# Patient Record
Sex: FEMALE | Race: WHITE | NOT HISPANIC OR LATINO | Employment: OTHER | URBAN - METROPOLITAN AREA
[De-identification: names, ages, dates, MRNs, and addresses within clinical notes are randomized per-mention and may not be internally consistent; named-entity substitution may affect disease eponyms.]

---

## 2017-03-16 ENCOUNTER — OFFICE VISIT - HEALTHEAST (OUTPATIENT)
Dept: INTERNAL MEDICINE | Facility: CLINIC | Age: 67
End: 2017-03-16

## 2017-03-16 DIAGNOSIS — Z00.00 PHYSICAL EXAM, ANNUAL: ICD-10-CM

## 2017-03-16 DIAGNOSIS — I10 ESSENTIAL HYPERTENSION: ICD-10-CM

## 2017-03-16 DIAGNOSIS — D12.6 ADENOMATOUS COLON POLYP: ICD-10-CM

## 2017-03-16 ASSESSMENT — MIFFLIN-ST. JEOR: SCORE: 1294.73

## 2017-03-27 ENCOUNTER — AMBULATORY - HEALTHEAST (OUTPATIENT)
Dept: INTERNAL MEDICINE | Facility: CLINIC | Age: 67
End: 2017-03-27

## 2017-05-17 ENCOUNTER — COMMUNICATION - HEALTHEAST (OUTPATIENT)
Dept: INTERNAL MEDICINE | Facility: CLINIC | Age: 67
End: 2017-05-17

## 2017-05-19 ENCOUNTER — COMMUNICATION - HEALTHEAST (OUTPATIENT)
Dept: INTERNAL MEDICINE | Facility: CLINIC | Age: 67
End: 2017-05-19

## 2017-07-06 ENCOUNTER — AMBULATORY - HEALTHEAST (OUTPATIENT)
Dept: INTERNAL MEDICINE | Facility: CLINIC | Age: 67
End: 2017-07-06

## 2017-07-06 DIAGNOSIS — G47.30 SLEEP APNEA: ICD-10-CM

## 2017-09-20 ENCOUNTER — OFFICE VISIT - HEALTHEAST (OUTPATIENT)
Dept: SLEEP MEDICINE | Facility: CLINIC | Age: 67
End: 2017-09-20

## 2017-09-20 DIAGNOSIS — G47.10 HYPERSOMNIA, UNSPECIFIED: ICD-10-CM

## 2017-09-20 DIAGNOSIS — E66.9 OBESITY: ICD-10-CM

## 2017-09-20 DIAGNOSIS — G47.8 SLEEP DYSFUNCTION WITH SLEEP STAGE DISTURBANCE: ICD-10-CM

## 2017-09-20 DIAGNOSIS — R06.83 SNORING: ICD-10-CM

## 2017-09-20 ASSESSMENT — MIFFLIN-ST. JEOR: SCORE: 1282.48

## 2017-09-27 ENCOUNTER — COMMUNICATION - HEALTHEAST (OUTPATIENT)
Dept: INTERNAL MEDICINE | Facility: CLINIC | Age: 67
End: 2017-09-27

## 2017-09-27 DIAGNOSIS — I10 ESSENTIAL HYPERTENSION: ICD-10-CM

## 2017-10-31 ENCOUNTER — OFFICE VISIT - HEALTHEAST (OUTPATIENT)
Dept: INTERNAL MEDICINE | Facility: CLINIC | Age: 67
End: 2017-10-31

## 2017-10-31 DIAGNOSIS — I10 ESSENTIAL HYPERTENSION: ICD-10-CM

## 2017-10-31 DIAGNOSIS — R00.2 PALPITATIONS: ICD-10-CM

## 2017-10-31 LAB
ATRIAL RATE - MUSE: 59 BPM
DIASTOLIC BLOOD PRESSURE - MUSE: NORMAL MMHG
INTERPRETATION ECG - MUSE: NORMAL
P AXIS - MUSE: 28 DEGREES
PR INTERVAL - MUSE: 176 MS
QRS DURATION - MUSE: 88 MS
QT - MUSE: 444 MS
QTC - MUSE: 439 MS
R AXIS - MUSE: -5 DEGREES
SYSTOLIC BLOOD PRESSURE - MUSE: NORMAL MMHG
T AXIS - MUSE: 11 DEGREES
VENTRICULAR RATE- MUSE: 59 BPM

## 2017-11-05 ENCOUNTER — RECORDS - HEALTHEAST (OUTPATIENT)
Dept: ADMINISTRATIVE | Facility: OTHER | Age: 67
End: 2017-11-05

## 2017-11-05 ENCOUNTER — RECORDS - HEALTHEAST (OUTPATIENT)
Dept: SLEEP MEDICINE | Facility: CLINIC | Age: 67
End: 2017-11-05

## 2017-11-05 DIAGNOSIS — G47.10 HYPERSOMNIA, UNSPECIFIED: ICD-10-CM

## 2017-11-05 DIAGNOSIS — R06.83 SNORING: ICD-10-CM

## 2017-11-05 DIAGNOSIS — G47.8 OTHER SLEEP DISORDERS: ICD-10-CM

## 2017-11-08 ENCOUNTER — COMMUNICATION - HEALTHEAST (OUTPATIENT)
Dept: SLEEP MEDICINE | Facility: CLINIC | Age: 67
End: 2017-11-08

## 2017-11-27 ENCOUNTER — OFFICE VISIT - HEALTHEAST (OUTPATIENT)
Dept: SLEEP MEDICINE | Facility: CLINIC | Age: 67
End: 2017-11-27

## 2017-11-27 ENCOUNTER — COMMUNICATION - HEALTHEAST (OUTPATIENT)
Dept: INTERNAL MEDICINE | Facility: CLINIC | Age: 67
End: 2017-11-27

## 2017-11-27 ENCOUNTER — OFFICE VISIT - HEALTHEAST (OUTPATIENT)
Dept: INTERNAL MEDICINE | Facility: CLINIC | Age: 67
End: 2017-11-27

## 2017-11-27 DIAGNOSIS — G47.10 HYPERSOMNIA: ICD-10-CM

## 2017-11-27 DIAGNOSIS — G47.8 UPPER AIRWAY RESISTANCE SYNDROME: ICD-10-CM

## 2017-11-27 DIAGNOSIS — G47.69 SLEEP-RELATED MOVEMENT DISORDER: ICD-10-CM

## 2017-11-27 DIAGNOSIS — I10 ESSENTIAL HYPERTENSION: ICD-10-CM

## 2017-11-27 DIAGNOSIS — I10 HTN (HYPERTENSION): ICD-10-CM

## 2017-11-27 ASSESSMENT — MIFFLIN-ST. JEOR: SCORE: 1291.09

## 2018-01-02 ENCOUNTER — COMMUNICATION - HEALTHEAST (OUTPATIENT)
Dept: INTERNAL MEDICINE | Facility: CLINIC | Age: 68
End: 2018-01-02

## 2018-01-05 ENCOUNTER — OFFICE VISIT - HEALTHEAST (OUTPATIENT)
Dept: INTERNAL MEDICINE | Facility: CLINIC | Age: 68
End: 2018-01-05

## 2018-01-05 DIAGNOSIS — R73.09 ELEVATED GLUCOSE: ICD-10-CM

## 2018-01-05 DIAGNOSIS — I10 HYPERTENSION: ICD-10-CM

## 2018-01-05 LAB
ANION GAP SERPL CALCULATED.3IONS-SCNC: 11 MMOL/L (ref 5–18)
BUN SERPL-MCNC: 24 MG/DL (ref 8–22)
CALCIUM SERPL-MCNC: 9.5 MG/DL (ref 8.5–10.5)
CHLORIDE BLD-SCNC: 103 MMOL/L (ref 98–107)
CO2 SERPL-SCNC: 27 MMOL/L (ref 22–31)
CREAT SERPL-MCNC: 0.95 MG/DL (ref 0.6–1.1)
GFR SERPL CREATININE-BSD FRML MDRD: 59 ML/MIN/1.73M2
GLUCOSE BLD-MCNC: 90 MG/DL (ref 70–125)
HBA1C MFR BLD: 5.7 % (ref 3.5–6)
POTASSIUM BLD-SCNC: 3.9 MMOL/L (ref 3.5–5)
SODIUM SERPL-SCNC: 141 MMOL/L (ref 136–145)

## 2018-01-25 ENCOUNTER — COMMUNICATION - HEALTHEAST (OUTPATIENT)
Dept: INTERNAL MEDICINE | Facility: CLINIC | Age: 68
End: 2018-01-25

## 2018-01-25 ENCOUNTER — OFFICE VISIT - HEALTHEAST (OUTPATIENT)
Dept: INTERNAL MEDICINE | Facility: CLINIC | Age: 68
End: 2018-01-25

## 2018-01-25 DIAGNOSIS — R05.9 COUGH: ICD-10-CM

## 2018-01-25 DIAGNOSIS — I10 ESSENTIAL HYPERTENSION: ICD-10-CM

## 2018-01-25 DIAGNOSIS — J10.1 INFLUENZA A: ICD-10-CM

## 2018-01-25 LAB
FLUAV AG SPEC QL IA: ABNORMAL
FLUBV AG SPEC QL IA: ABNORMAL

## 2018-03-09 ENCOUNTER — HOSPITAL ENCOUNTER (OUTPATIENT)
Dept: MAMMOGRAPHY | Facility: CLINIC | Age: 68
Discharge: HOME | End: 2018-03-09
Attending: INTERNAL MEDICINE

## 2018-03-09 DIAGNOSIS — Z12.31 VISIT FOR SCREENING MAMMOGRAM: ICD-10-CM

## 2018-12-17 ENCOUNTER — OFFICE VISIT - HEALTHEAST (OUTPATIENT)
Dept: FAMILY MEDICINE | Facility: CLINIC | Age: 68
End: 2018-12-17

## 2018-12-17 DIAGNOSIS — I10 ESSENTIAL HYPERTENSION: ICD-10-CM

## 2018-12-17 DIAGNOSIS — N39.46 MIXED INCONTINENCE URGE AND STRESS (MALE)(FEMALE): ICD-10-CM

## 2018-12-17 DIAGNOSIS — Z76.89 ESTABLISHING CARE WITH NEW DOCTOR, ENCOUNTER FOR: ICD-10-CM

## 2018-12-26 ENCOUNTER — COMMUNICATION - HEALTHEAST (OUTPATIENT)
Dept: INTERNAL MEDICINE | Facility: CLINIC | Age: 68
End: 2018-12-26

## 2019-02-06 ENCOUNTER — COMMUNICATION - HEALTHEAST (OUTPATIENT)
Dept: FAMILY MEDICINE | Facility: CLINIC | Age: 69
End: 2019-02-06

## 2019-02-06 DIAGNOSIS — Z00.00 ROUTINE GENERAL MEDICAL EXAMINATION AT A HEALTH CARE FACILITY: ICD-10-CM

## 2019-03-25 ENCOUNTER — HOSPITAL ENCOUNTER (OUTPATIENT)
Dept: MAMMOGRAPHY | Facility: CLINIC | Age: 69
Discharge: HOME | End: 2019-03-25

## 2019-03-25 DIAGNOSIS — Z12.31 ENCOUNTER FOR SCREENING MAMMOGRAM FOR BREAST CANCER: ICD-10-CM

## 2019-04-02 ENCOUNTER — OFFICE VISIT - HEALTHEAST (OUTPATIENT)
Dept: FAMILY MEDICINE | Facility: CLINIC | Age: 69
End: 2019-04-02

## 2019-04-02 DIAGNOSIS — M79.661 PAIN OF RIGHT LOWER LEG: ICD-10-CM

## 2019-04-03 ENCOUNTER — HOSPITAL ENCOUNTER (OUTPATIENT)
Dept: ULTRASOUND IMAGING | Facility: CLINIC | Age: 69
Discharge: HOME | End: 2019-04-03

## 2019-04-03 DIAGNOSIS — M79.661 PAIN OF RIGHT LOWER LEG: ICD-10-CM

## 2019-04-04 ENCOUNTER — COMMUNICATION - HEALTHEAST (OUTPATIENT)
Dept: FAMILY MEDICINE | Facility: CLINIC | Age: 69
End: 2019-04-04

## 2019-04-09 ENCOUNTER — COMMUNICATION - HEALTHEAST (OUTPATIENT)
Dept: FAMILY MEDICINE | Facility: CLINIC | Age: 69
End: 2019-04-09

## 2019-04-09 DIAGNOSIS — M79.661 PAIN OF RIGHT LOWER LEG: ICD-10-CM

## 2019-04-09 DIAGNOSIS — I10 ESSENTIAL HYPERTENSION: ICD-10-CM

## 2019-04-09 DIAGNOSIS — N32.81 OVERACTIVE BLADDER: ICD-10-CM

## 2019-04-12 ENCOUNTER — RECORDS - HEALTHEAST (OUTPATIENT)
Dept: ADMINISTRATIVE | Facility: OTHER | Age: 69
End: 2019-04-12

## 2019-05-03 ENCOUNTER — RECORDS - HEALTHEAST (OUTPATIENT)
Dept: ADMINISTRATIVE | Facility: OTHER | Age: 69
End: 2019-05-03

## 2019-05-10 ENCOUNTER — RECORDS - HEALTHEAST (OUTPATIENT)
Dept: ADMINISTRATIVE | Facility: OTHER | Age: 69
End: 2019-05-10

## 2019-05-15 ENCOUNTER — RECORDS - HEALTHEAST (OUTPATIENT)
Dept: ADMINISTRATIVE | Facility: OTHER | Age: 69
End: 2019-05-15

## 2019-06-07 ENCOUNTER — RECORDS - HEALTHEAST (OUTPATIENT)
Dept: ADMINISTRATIVE | Facility: OTHER | Age: 69
End: 2019-06-07

## 2019-07-07 ENCOUNTER — COMMUNICATION - HEALTHEAST (OUTPATIENT)
Dept: FAMILY MEDICINE | Facility: CLINIC | Age: 69
End: 2019-07-07

## 2019-07-07 DIAGNOSIS — I10 ESSENTIAL HYPERTENSION: ICD-10-CM

## 2019-07-12 ENCOUNTER — RECORDS - HEALTHEAST (OUTPATIENT)
Dept: ADMINISTRATIVE | Facility: OTHER | Age: 69
End: 2019-07-12

## 2019-07-29 ENCOUNTER — RECORDS - HEALTHEAST (OUTPATIENT)
Dept: ADMINISTRATIVE | Facility: OTHER | Age: 69
End: 2019-07-29

## 2019-09-06 ENCOUNTER — RECORDS - HEALTHEAST (OUTPATIENT)
Dept: ADMINISTRATIVE | Facility: OTHER | Age: 69
End: 2019-09-06

## 2019-10-22 ENCOUNTER — OFFICE VISIT - HEALTHEAST (OUTPATIENT)
Dept: FAMILY MEDICINE | Facility: CLINIC | Age: 69
End: 2019-10-22

## 2019-10-22 DIAGNOSIS — B07.8 COMMON WART: ICD-10-CM

## 2019-10-22 DIAGNOSIS — R00.2 HEART PALPITATIONS: ICD-10-CM

## 2019-10-22 DIAGNOSIS — I10 ESSENTIAL HYPERTENSION: ICD-10-CM

## 2019-10-22 LAB
ALBUMIN SERPL-MCNC: 4.2 G/DL (ref 3.5–5)
ALP SERPL-CCNC: 86 U/L (ref 45–120)
ALT SERPL W P-5'-P-CCNC: 34 U/L (ref 0–45)
ANION GAP SERPL CALCULATED.3IONS-SCNC: 9 MMOL/L (ref 5–18)
AST SERPL W P-5'-P-CCNC: 24 U/L (ref 0–40)
BILIRUB SERPL-MCNC: 0.7 MG/DL (ref 0–1)
BUN SERPL-MCNC: 14 MG/DL (ref 8–22)
CALCIUM SERPL-MCNC: 9.7 MG/DL (ref 8.5–10.5)
CHLORIDE BLD-SCNC: 104 MMOL/L (ref 98–107)
CHOLEST SERPL-MCNC: 216 MG/DL
CO2 SERPL-SCNC: 28 MMOL/L (ref 22–31)
CREAT SERPL-MCNC: 0.97 MG/DL (ref 0.6–1.1)
FASTING STATUS PATIENT QL REPORTED: YES
GFR SERPL CREATININE-BSD FRML MDRD: 57 ML/MIN/1.73M2
GLUCOSE BLD-MCNC: 89 MG/DL (ref 70–125)
HDLC SERPL-MCNC: 65 MG/DL
LDLC SERPL CALC-MCNC: 125 MG/DL
POTASSIUM BLD-SCNC: 4.6 MMOL/L (ref 3.5–5)
PROT SERPL-MCNC: 6.6 G/DL (ref 6–8)
SODIUM SERPL-SCNC: 141 MMOL/L (ref 136–145)
TRIGL SERPL-MCNC: 129 MG/DL
TSH SERPL DL<=0.005 MIU/L-ACNC: 1.77 UIU/ML (ref 0.3–5)

## 2019-10-22 RX ORDER — HYDROCHLOROTHIAZIDE 25 MG/1
25 TABLET ORAL DAILY
Qty: 90 TABLET | Refills: 3 | Status: SHIPPED | OUTPATIENT
Start: 2019-10-22 | End: 2022-01-15

## 2019-10-30 ENCOUNTER — RECORDS - HEALTHEAST (OUTPATIENT)
Dept: ADMINISTRATIVE | Facility: OTHER | Age: 69
End: 2019-10-30

## 2019-11-05 ENCOUNTER — HOSPITAL ENCOUNTER (OUTPATIENT)
Dept: CARDIOLOGY | Facility: CLINIC | Age: 69
Discharge: HOME | End: 2019-11-05

## 2019-11-05 ENCOUNTER — OFFICE VISIT - HEALTHEAST (OUTPATIENT)
Dept: FAMILY MEDICINE | Facility: CLINIC | Age: 69
End: 2019-11-05

## 2019-11-05 DIAGNOSIS — R00.2 HEART PALPITATIONS: ICD-10-CM

## 2019-11-05 DIAGNOSIS — I10 ESSENTIAL HYPERTENSION: ICD-10-CM

## 2019-11-05 DIAGNOSIS — B07.8 COMMON WART: ICD-10-CM

## 2019-11-05 LAB
AORTIC ROOT: 3.1 CM
BSA FOR ECHO PROCEDURE: 1.89 M2
CV BLOOD PRESSURE: ABNORMAL MMHG
CV ECHO HEIGHT: 64 IN
CV ECHO WEIGHT: 174 LBS
DOP CALC LVOT AREA: 2.54 CM2
DOP CALC LVOT DIAMETER: 1.8 CM
DOP CALC LVOT PEAK VEL: 82.7 CM/S
DOP CALC LVOT STROKE VOLUME: 45.5 CM3
DOP CALC MV VTI: 30 CM
DOP CALCLVOT PEAK VEL VTI: 17.9 CM
EJECTION FRACTION: 73 % (ref 55–75)
FRACTIONAL SHORTENING: 44.3 % (ref 28–44)
INTERVENTRICULAR SEPTUM IN END DIASTOLE: 0.92 CM (ref 0.6–0.9)
IVS/PW RATIO: 1.1
LA AREA 1: 17.2 CM2
LA AREA 2: 13.3 CM2
LEFT ATRIUM LENGTH: 4.82 CM
LEFT ATRIUM SIZE: 4.2 CM
LEFT ATRIUM TO AORTIC ROOT RATIO: 1.35 NO UNITS
LEFT ATRIUM VOLUME INDEX: 21.3 ML/M2
LEFT ATRIUM VOLUME: 40.3 ML
LEFT VENTRICLE CARDIAC INDEX: 1.6 L/MIN/M2
LEFT VENTRICLE CARDIAC OUTPUT: 3 L/MIN
LEFT VENTRICLE DIASTOLIC VOLUME INDEX: 23.3 CM3/M2 (ref 29–61)
LEFT VENTRICLE DIASTOLIC VOLUME: 44 CM3 (ref 46–106)
LEFT VENTRICLE HEART RATE: 65 BPM
LEFT VENTRICLE MASS INDEX: 74.8 G/M2
LEFT VENTRICLE SYSTOLIC VOLUME INDEX: 6.3 CM3/M2 (ref 8–24)
LEFT VENTRICLE SYSTOLIC VOLUME: 12 CM3 (ref 14–42)
LEFT VENTRICULAR INTERNAL DIMENSION IN DIASTOLE: 4.83 CM (ref 3.8–5.2)
LEFT VENTRICULAR INTERNAL DIMENSION IN SYSTOLE: 2.69 CM (ref 2.2–3.5)
LEFT VENTRICULAR MASS: 141.3 G
LEFT VENTRICULAR OUTFLOW TRACT MEAN GRADIENT: 2 MMHG
LEFT VENTRICULAR OUTFLOW TRACT MEAN VELOCITY: 59.1 CM/S
LEFT VENTRICULAR OUTFLOW TRACT PEAK GRADIENT: 3 MMHG
LEFT VENTRICULAR POSTERIOR WALL IN END DIASTOLE: 0.81 CM (ref 0.6–0.9)
LV STROKE VOLUME INDEX: 24.1 ML/M2
MITRAL VALVE E/A RATIO: 1
MITRAL VALVE MEAN INFLOW VELOCITY: 66.3 CM/S
MITRAL VALVE PEAK VELOCITY: 102 CM/S
MV AREA VTI: 1.52 CM2
MV AVERAGE E/E' RATIO: 9.6 CM/S
MV DECELERATION TIME: 261 MS
MV E'TISSUE VEL-LAT: 10.8 CM/S
MV E'TISSUE VEL-MED: 6.43 CM/S
MV LATERAL E/E' RATIO: 7.7
MV MEAN GRADIENT: 2 MMHG
MV MEDIAL E/E' RATIO: 12.9
MV PEAK A VELOCITY: 83.9 CM/S
MV PEAK E VELOCITY: 82.9 CM/S
MV PEAK GRADIENT: 4.2 MMHG
MV VALVE AREA BY CONTINUITY EQUATION: 1.5 CM2
NUC REST DIASTOLIC VOLUME INDEX: 2784 LBS
NUC REST SYSTOLIC VOLUME INDEX: 64 IN
TRICUSPID REGURGITATION PEAK PRESSURE GRADIENT: 22.1 MMHG
TRICUSPID VALVE ANULAR PLANE SYSTOLIC EXCURSION: 2.1 CM
TRICUSPID VALVE PEAK REGURGITANT VELOCITY: 235 CM/S

## 2019-11-05 ASSESSMENT — MIFFLIN-ST. JEOR: SCORE: 1299.26

## 2019-11-11 ENCOUNTER — COMMUNICATION - HEALTHEAST (OUTPATIENT)
Dept: FAMILY MEDICINE | Facility: CLINIC | Age: 69
End: 2019-11-11

## 2019-11-12 ENCOUNTER — RECORDS - HEALTHEAST (OUTPATIENT)
Dept: ADMINISTRATIVE | Facility: OTHER | Age: 69
End: 2019-11-12

## 2019-11-25 ENCOUNTER — RECORDS - HEALTHEAST (OUTPATIENT)
Dept: ADMINISTRATIVE | Facility: OTHER | Age: 69
End: 2019-11-25

## 2019-12-20 ENCOUNTER — OFFICE VISIT - HEALTHEAST (OUTPATIENT)
Dept: FAMILY MEDICINE | Facility: CLINIC | Age: 69
End: 2019-12-20

## 2019-12-20 DIAGNOSIS — J02.9 VIRAL PHARYNGITIS: ICD-10-CM

## 2019-12-20 LAB — DEPRECATED S PYO AG THROAT QL EIA: NORMAL

## 2019-12-21 LAB — GROUP A STREP BY PCR: NORMAL

## 2020-01-02 ENCOUNTER — OFFICE VISIT - HEALTHEAST (OUTPATIENT)
Dept: FAMILY MEDICINE | Facility: CLINIC | Age: 70
End: 2020-01-02

## 2020-01-02 DIAGNOSIS — Z01.818 ENCOUNTER FOR PREOPERATIVE EXAMINATION FOR GENERAL SURGICAL PROCEDURE: ICD-10-CM

## 2020-01-02 DIAGNOSIS — M17.11 PRIMARY OSTEOARTHRITIS OF RIGHT KNEE: ICD-10-CM

## 2020-01-02 LAB
ALBUMIN UR-MCNC: NEGATIVE MG/DL
ANION GAP SERPL CALCULATED.3IONS-SCNC: 10 MMOL/L (ref 5–18)
APPEARANCE UR: CLEAR
ATRIAL RATE - MUSE: 73 BPM
BACTERIA #/AREA URNS HPF: ABNORMAL HPF
BILIRUB UR QL STRIP: NEGATIVE
BUN SERPL-MCNC: 20 MG/DL (ref 8–22)
CALCIUM SERPL-MCNC: 9.5 MG/DL (ref 8.5–10.5)
CHLORIDE BLD-SCNC: 102 MMOL/L (ref 98–107)
CO2 SERPL-SCNC: 27 MMOL/L (ref 22–31)
COLOR UR AUTO: YELLOW
CREAT SERPL-MCNC: 1.13 MG/DL (ref 0.6–1.1)
DIASTOLIC BLOOD PRESSURE - MUSE: NORMAL
GFR SERPL CREATININE-BSD FRML MDRD: 48 ML/MIN/1.73M2
GLUCOSE BLD-MCNC: 91 MG/DL (ref 70–125)
GLUCOSE UR STRIP-MCNC: NEGATIVE MG/DL
HGB BLD-MCNC: 12.5 G/DL (ref 12–16)
HGB UR QL STRIP: ABNORMAL
INR PPP: 0.95 (ref 0.9–1.1)
INTERPRETATION ECG - MUSE: NORMAL
KETONES UR STRIP-MCNC: NEGATIVE MG/DL
LEUKOCYTE ESTERASE UR QL STRIP: NEGATIVE
NITRATE UR QL: NEGATIVE
P AXIS - MUSE: 46 DEGREES
PH UR STRIP: 5.5 [PH] (ref 5–8)
POTASSIUM BLD-SCNC: 3.4 MMOL/L (ref 3.5–5)
PR INTERVAL - MUSE: 184 MS
QRS DURATION - MUSE: 86 MS
QT - MUSE: 402 MS
QTC - MUSE: 442 MS
R AXIS - MUSE: 2 DEGREES
RBC #/AREA URNS AUTO: ABNORMAL HPF
SODIUM SERPL-SCNC: 139 MMOL/L (ref 136–145)
SP GR UR STRIP: 1.02 (ref 1–1.03)
SQUAMOUS #/AREA URNS AUTO: ABNORMAL LPF
SYSTOLIC BLOOD PRESSURE - MUSE: NORMAL
T AXIS - MUSE: 18 DEGREES
UROBILINOGEN UR STRIP-ACNC: ABNORMAL
VENTRICULAR RATE- MUSE: 73 BPM
WBC #/AREA URNS AUTO: ABNORMAL HPF

## 2020-01-03 ENCOUNTER — RECORDS - HEALTHEAST (OUTPATIENT)
Dept: LAB | Facility: CLINIC | Age: 70
End: 2020-01-03

## 2020-01-03 LAB
ERYTHROCYTE [DISTWIDTH] IN BLOOD BY AUTOMATED COUNT: 13.2 % (ref 11–14.5)
HCT VFR BLD AUTO: 39.4 % (ref 35–47)
HGB BLD-MCNC: 12.8 G/DL (ref 12–16)
MCH RBC QN AUTO: 30.9 PG (ref 27–34)
MCHC RBC AUTO-ENTMCNC: 32.5 G/DL (ref 32–36)
MCV RBC AUTO: 95 FL (ref 80–100)
PLATELET # BLD AUTO: 287 THOU/UL (ref 140–440)
PMV BLD AUTO: 9.3 FL (ref 8.5–12.5)
RBC # BLD AUTO: 4.14 MILL/UL (ref 3.8–5.4)
WBC: 7.7 THOU/UL (ref 4–11)

## 2020-01-06 ENCOUNTER — COMMUNICATION - HEALTHEAST (OUTPATIENT)
Dept: FAMILY MEDICINE | Facility: CLINIC | Age: 70
End: 2020-01-06

## 2020-01-09 ENCOUNTER — RECORDS - HEALTHEAST (OUTPATIENT)
Dept: ADMINISTRATIVE | Facility: OTHER | Age: 70
End: 2020-01-09

## 2020-01-14 ENCOUNTER — OFFICE VISIT - HEALTHEAST (OUTPATIENT)
Dept: FAMILY MEDICINE | Facility: CLINIC | Age: 70
End: 2020-01-14

## 2020-01-14 DIAGNOSIS — R30.9 URINARY PAIN: ICD-10-CM

## 2020-01-14 DIAGNOSIS — R94.4 DECREASED GFR: ICD-10-CM

## 2020-01-14 LAB
ALBUMIN UR-MCNC: NEGATIVE MG/DL
ANION GAP SERPL CALCULATED.3IONS-SCNC: 8 MMOL/L (ref 5–18)
APPEARANCE UR: CLEAR
BACTERIA #/AREA URNS HPF: ABNORMAL HPF
BILIRUB UR QL STRIP: NEGATIVE
BUN SERPL-MCNC: 23 MG/DL (ref 8–22)
CALCIUM SERPL-MCNC: 9.4 MG/DL (ref 8.5–10.5)
CHLORIDE BLD-SCNC: 102 MMOL/L (ref 98–107)
CO2 SERPL-SCNC: 27 MMOL/L (ref 22–31)
COLOR UR AUTO: YELLOW
CREAT SERPL-MCNC: 1 MG/DL (ref 0.6–1.1)
GFR SERPL CREATININE-BSD FRML MDRD: 55 ML/MIN/1.73M2
GLUCOSE BLD-MCNC: 117 MG/DL (ref 70–125)
GLUCOSE UR STRIP-MCNC: NEGATIVE MG/DL
HGB UR QL STRIP: ABNORMAL
KETONES UR STRIP-MCNC: NEGATIVE MG/DL
LEUKOCYTE ESTERASE UR QL STRIP: NEGATIVE
NITRATE UR QL: NEGATIVE
PH UR STRIP: 7 [PH] (ref 5–8)
POTASSIUM BLD-SCNC: 4.2 MMOL/L (ref 3.5–5)
RBC #/AREA URNS AUTO: ABNORMAL HPF
SODIUM SERPL-SCNC: 137 MMOL/L (ref 136–145)
SP GR UR STRIP: 1.02 (ref 1–1.03)
SQUAMOUS #/AREA URNS AUTO: ABNORMAL LPF
UROBILINOGEN UR STRIP-ACNC: ABNORMAL
WBC #/AREA URNS AUTO: ABNORMAL HPF

## 2020-01-14 RX ORDER — HYDROXYZINE HYDROCHLORIDE 25 MG/1
25 TABLET, FILM COATED ORAL EVERY 6 HOURS PRN
Status: SHIPPED | COMMUNITY
Start: 2020-01-03 | End: 2021-12-15

## 2020-01-14 RX ORDER — KETOROLAC TROMETHAMINE 10 MG/1
10 TABLET, FILM COATED ORAL 4 TIMES DAILY PRN
Status: SHIPPED | COMMUNITY
Start: 2020-01-03 | End: 2021-08-04

## 2020-01-30 ENCOUNTER — RECORDS - HEALTHEAST (OUTPATIENT)
Dept: ADMINISTRATIVE | Facility: OTHER | Age: 70
End: 2020-01-30

## 2020-02-28 ENCOUNTER — RECORDS - HEALTHEAST (OUTPATIENT)
Dept: ADMINISTRATIVE | Facility: OTHER | Age: 70
End: 2020-02-28

## 2020-08-13 ENCOUNTER — OFFICE VISIT - HEALTHEAST (OUTPATIENT)
Dept: FAMILY MEDICINE | Facility: CLINIC | Age: 70
End: 2020-08-13

## 2020-08-13 DIAGNOSIS — I10 ESSENTIAL HYPERTENSION: ICD-10-CM

## 2020-11-19 ENCOUNTER — OFFICE VISIT - HEALTHEAST (OUTPATIENT)
Dept: FAMILY MEDICINE | Facility: CLINIC | Age: 70
End: 2020-11-19

## 2020-11-19 DIAGNOSIS — Z00.00 ROUTINE GENERAL MEDICAL EXAMINATION AT A HEALTH CARE FACILITY: ICD-10-CM

## 2020-11-19 DIAGNOSIS — Z23 NEED FOR VACCINATION: ICD-10-CM

## 2020-11-19 DIAGNOSIS — Z13.220 ENCOUNTER FOR SCREENING FOR LIPOID DISORDERS: ICD-10-CM

## 2020-11-19 DIAGNOSIS — B35.1 NAIL FUNGAL INFECTION: ICD-10-CM

## 2020-11-19 DIAGNOSIS — R30.0 DYSURIA: ICD-10-CM

## 2020-11-19 LAB
ALBUMIN UR-MCNC: NEGATIVE MG/DL
APPEARANCE UR: CLEAR
BACTERIA #/AREA URNS HPF: ABNORMAL HPF
BILIRUB UR QL STRIP: NEGATIVE
COLOR UR AUTO: YELLOW
GLUCOSE UR STRIP-MCNC: NEGATIVE MG/DL
HGB UR QL STRIP: ABNORMAL
KETONES UR STRIP-MCNC: NEGATIVE MG/DL
LEUKOCYTE ESTERASE UR QL STRIP: NEGATIVE
NITRATE UR QL: NEGATIVE
PH UR STRIP: 5.5 [PH] (ref 5–8)
RBC #/AREA URNS AUTO: ABNORMAL HPF
SP GR UR STRIP: >=1.03 (ref 1–1.03)
SQUAMOUS #/AREA URNS AUTO: ABNORMAL LPF
UROBILINOGEN UR STRIP-ACNC: ABNORMAL
WBC #/AREA URNS AUTO: ABNORMAL HPF

## 2020-11-19 ASSESSMENT — MIFFLIN-ST. JEOR: SCORE: 1287.92

## 2020-11-23 ENCOUNTER — COMMUNICATION - HEALTHEAST (OUTPATIENT)
Dept: FAMILY MEDICINE | Facility: CLINIC | Age: 70
End: 2020-11-23

## 2020-11-27 ENCOUNTER — COMMUNICATION - HEALTHEAST (OUTPATIENT)
Dept: FAMILY MEDICINE | Facility: CLINIC | Age: 70
End: 2020-11-27

## 2020-12-02 ENCOUNTER — COMMUNICATION - HEALTHEAST (OUTPATIENT)
Dept: FAMILY MEDICINE | Facility: CLINIC | Age: 70
End: 2020-12-02

## 2020-12-02 DIAGNOSIS — I10 ESSENTIAL HYPERTENSION: ICD-10-CM

## 2020-12-02 RX ORDER — LOSARTAN POTASSIUM 100 MG/1
100 TABLET ORAL DAILY
Qty: 90 TABLET | Refills: 3 | Status: SHIPPED | OUTPATIENT
Start: 2020-12-02 | End: 2021-12-15

## 2020-12-02 RX ORDER — AMLODIPINE BESYLATE 5 MG/1
5 TABLET ORAL DAILY
Qty: 90 TABLET | Refills: 3 | Status: SHIPPED | OUTPATIENT
Start: 2020-12-02 | End: 2021-12-15

## 2020-12-03 ENCOUNTER — AMBULATORY - HEALTHEAST (OUTPATIENT)
Dept: LAB | Facility: CLINIC | Age: 70
End: 2020-12-03

## 2020-12-03 DIAGNOSIS — Z00.00 ROUTINE GENERAL MEDICAL EXAMINATION AT A HEALTH CARE FACILITY: ICD-10-CM

## 2020-12-03 DIAGNOSIS — Z13.220 ENCOUNTER FOR SCREENING FOR LIPOID DISORDERS: ICD-10-CM

## 2020-12-03 LAB
ANION GAP SERPL CALCULATED.3IONS-SCNC: 11 MMOL/L (ref 5–18)
BUN SERPL-MCNC: 21 MG/DL (ref 8–22)
CALCIUM SERPL-MCNC: 9.1 MG/DL (ref 8.5–10.5)
CHLORIDE BLD-SCNC: 105 MMOL/L (ref 98–107)
CHOLEST SERPL-MCNC: 168 MG/DL
CO2 SERPL-SCNC: 25 MMOL/L (ref 22–31)
CREAT SERPL-MCNC: 0.96 MG/DL (ref 0.6–1.1)
FASTING STATUS PATIENT QL REPORTED: YES
GFR SERPL CREATININE-BSD FRML MDRD: 58 ML/MIN/1.73M2
GLUCOSE BLD-MCNC: 92 MG/DL (ref 70–125)
HDLC SERPL-MCNC: 57 MG/DL
HGB BLD-MCNC: 12.2 G/DL (ref 12–16)
LDLC SERPL CALC-MCNC: 95 MG/DL
POTASSIUM BLD-SCNC: 4.3 MMOL/L (ref 3.5–5)
SODIUM SERPL-SCNC: 141 MMOL/L (ref 136–145)
TRIGL SERPL-MCNC: 79 MG/DL

## 2021-01-24 ENCOUNTER — COMMUNICATION - HEALTHEAST (OUTPATIENT)
Dept: FAMILY MEDICINE | Facility: CLINIC | Age: 71
End: 2021-01-24

## 2021-02-22 ENCOUNTER — RECORDS - HEALTHEAST (OUTPATIENT)
Dept: ADMINISTRATIVE | Facility: OTHER | Age: 71
End: 2021-02-22

## 2021-05-26 ENCOUNTER — RECORDS - HEALTHEAST (OUTPATIENT)
Dept: ADMINISTRATIVE | Facility: CLINIC | Age: 71
End: 2021-05-26

## 2021-05-27 NOTE — PROGRESS NOTES
"Attestation:  I Sally Garrett DNP, performed a history and physical examination of the patient and discussed management with the NP student. I reviewed the NP student s note and agree with the documented findings and plan of care.       Assessment/Plan:  1. Right popliteal pain with unknown etiology  -Elevate leg when resting  - Heat to affected area 1-2 times a day  -Continue with ADLs and exercise as tolerated  -Take OTC ibuprofen 400mg-600mg [id number] PRN  -Lower extremity ultra sound to rule out blood clot  -RTC if pain worsen (e.g., interfere with ADLs, difficulty walking, LEs swelling)       Subjective:   Patient is a 68 y.o female presents in clinic today with complain of right popliteal pain that she describes as \"constant aches that at times will wake her up in the middle of the night\". Patient reports that her symptoms has been present for the last 6 weeks. She tried OTC ibuprofen and naproxen 1x every 2 weeks with good effect. Patient indicated that she tries to do \"stick it out and not take medications\" for her pain.    Review of System:  Constitutional: alert, calm without any overt signs of distress.  Skin: denies any rashes  ENT: Denies nasal congestion and post-nasal drip. Denies dizziness or loss of balance. Denies dysphagia and GERD/heartburn.   Cardiovascular: Denies heart palpitation, dizziness, chest pain, orthopnea, or edema  Pulmonary: Denies any shortness of breath, wheezes, or rhonchi.   GI: Denies n/v, diarrhea, or abdominal pain.  : Denies dysuria.  Abdominal: Denies gallbladder pain, liver, spleen problem. Denies any hernia.  Musculoskeletal: C/O of R popliteal pain that patient reports as constant and achy. She uses OTC ibuprofen and naproxen PRN for her pain and reports wit good effects for her pain. Patient denies any injury.  Neurological: Denies weakness, numbness, paraesthesia, of LOC, or coordination changes.     Objective:  /85   Pulse 83   Wt 174 lb 5 oz (79.1 kg)  "  LMP 03/09/2007   SpO2 94%   BMI 29.92 kg/m       General appearance: alert without any sign of distress  Head: Normocephalic, without obvious abnormality, atraumatic  Eyes: conjunctivae and corneas clear. PERRL, EOM's intact. Fundi benign.  Ears: normal tympanic membrane and external ear canals both ears  Throat: lips, mucosa, and tongue normal; teeth and gums normal  Neck:  No carotid bruit, no JVD, supple, symmetrical, trachea midline and thyroid not enlarged, symmetric, no tenderness/mass/nodules  Back: symmetric, no curvature. ROM normal. No CVA tenderness.  Lungs: Clear.  No adventitious lung sounds noted.   Chest wall: no tenderness  Heart: regular rate and rhythm, S1, S2 normal, no murmur, click, rub or gallop  Abdomen: soft, non-tender; bowel sounds normal; no masses,  no organomegaly  Extremities: extremities normal, atraumatic, no cyanosis or edema  Pulses: 2+ and symmetric  Skin: Skin color, texture, turgor normal. No rashes or lesions  Lymph nodes: No adenopathy. Cervical, submandibular, supraclavicular, and axillary nodes normal.  Musculoskeletal: No erythema or edema noted. Slight pain on the R popliteal that can be intermittently reproduce. No signs of LEs structure abnormality. No crepitus noted on palpation. Normal gait pattern when patient ambulates.    Neurologic: Grossly normal.

## 2021-05-30 VITALS — HEIGHT: 65 IN | WEIGHT: 170.9 LBS | BODY MASS INDEX: 28.47 KG/M2

## 2021-05-30 NOTE — TELEPHONE ENCOUNTER
RN cannot approve Refill Request    RN can NOT refill this medication PCP messaged that patient is overdue for Labs. Last office visit: 4/2/2019 Sally Garrett FNP Last Physical: Visit date not found Last MTM visit: Visit date not found Last visit same specialty: 4/2/2019 Sally Garrett FNP.  Next visit within 3 mo: Visit date not found  Next physical within 3 mo: Visit date not found      Katy Kinsey, [facility] Triage/Med Refill 7/7/2019    Requested Prescriptions   Pending Prescriptions Disp Refills     hydroCHLOROthiazide (HYDRODIURIL) 25 MG tablet [Pharmacy Med Name: HYDROCHLOROTHIAZIDE 25 MG TAB] 90 tablet 0     Sig: TAKE 1 TABLET BY MOUTH EVERY DAY       Diuretics/Combination Diuretics Refill Protocol  Failed - 7/7/2019 11:00 AM        Failed - Serum Potassium in past 12 months      No results found for: LN-POTASSIUM          Failed - Serum Sodium in past 12 months      No results found for: LN-SODIUM          Failed - Serum Creatinine in past 12 months      Creatinine   Date Value Ref Range Status   01/05/2018 0.95 0.60 - 1.10 mg/dL Final             Passed - Visit with PCP or prescribing provider visit in past 12 months     Last office visit with prescriber/PCP: 4/2/2019 Sally Garrett FNP OR same dept: 4/2/2019 Sally Garrett FNP OR same specialty: 4/2/2019 Sally Garrett FNP  Last physical: Visit date not found Last MTM visit: Visit date not found   Next visit within 3 mo: Visit date not found  Next physical within 3 mo: Visit date not found  Prescriber OR PCP: NAV Spivey  Last diagnosis associated with med order: 1. Hypertension  - hydroCHLOROthiazide (HYDRODIURIL) 25 MG tablet [Pharmacy Med Name: HYDROCHLOROTHIAZIDE 25 MG TAB]; TAKE 1 TABLET BY MOUTH EVERY DAY  Dispense: 90 tablet; Refill: 0    If protocol passes may refill for 12 months if within 3 months of last provider visit (or a total of 15 months).             Passed - Blood pressure on file in past  12 months     BP Readings from Last 1 Encounters:   04/02/19 118/85

## 2021-05-31 VITALS — BODY MASS INDEX: 29.78 KG/M2 | WEIGHT: 173.5 LBS

## 2021-05-31 VITALS — BODY MASS INDEX: 29.4 KG/M2 | WEIGHT: 172.2 LBS | HEIGHT: 64 IN

## 2021-05-31 VITALS — HEIGHT: 64 IN | WEIGHT: 170.3 LBS | BODY MASS INDEX: 29.08 KG/M2

## 2021-06-02 VITALS — BODY MASS INDEX: 29.92 KG/M2 | WEIGHT: 174.31 LBS

## 2021-06-02 VITALS — WEIGHT: 169.3 LBS | BODY MASS INDEX: 29.06 KG/M2

## 2021-06-02 NOTE — PROGRESS NOTES
Assessment:   1. Hypertension  Recommend adding a calcium channel blocker to current therapy and continue to monitor blood pressure.   - losartan (COZAAR) 100 MG tablet; Take 1 tablet (100 mg total) by mouth daily.  Dispense: 90 tablet; Refill: 3  - hydroCHLOROthiazide (HYDRODIURIL) 25 MG tablet; Take 1 tablet (25 mg total) by mouth daily.  Dispense: 90 tablet; Refill: 3  - amLODIPine (NORVASC) 5 MG tablet; Take 1 tablet (5 mg total) by mouth daily.  Dispense: 30 tablet; Refill: 0  - Lipid Cascade    2. Heart palpitations  Etiology still not certain, we'll obtain labs as noted to reassess  - Holter Monitor; Future  - Echo Complete; Future  - Thyroid Stimulating Hormone (TSH)  - Comprehensive Metabolic Panel    3. Common wart  1. The viral etiology and natural history has been discussed.   2. Various treatment methods, side effects and failure rates have been discussed.    3. A choice of liquid nitrogen was made, and the expected blistering or scabbing reaction explained.  4. Liquid nitrogen was applied to warts for 2 second freeze/thaw cycles.  5. The patient will return at 2-4 week intervals for retreatment's as needed.      Plan:   Medication: begin Almodipine.  Screening for causes of secondary hypertension: TSH (thyroid disease).  Dietary sodium restriction.  Regular aerobic exercise.  Check blood pressures 2 times daily and record.  Follow up: 2 weeks and as needed.     Subjective:   Patient here for follow-up of elevated blood pressure, she also reports that he is waking up in the middle of the night without palpitation.  She stated that this is occurred several times.  She has not experienced this during the day.  She also noted that her blood pressure has slowly crept up.  She denied any changes to her diet or lifestyle.  She denies chest pain, shortness of breath, and syncope. Cardiovascular risk factors: advanced age (older than 55 for men, 65 for women) and hypertension. Use of agents associated with  hypertension: none. History of target organ damage: none. Patient also complains of warts. The warts are located on tip of the middle finger. They have been present for several months. The patient denies pain or cellulitic infection symptoms.    The following portions of the patient's history were reviewed and updated as appropriate: allergies, current medications, past family history, past medical history, past social history, past surgical history and problem list.    Review of Systems  A 12 point comprehensive review of systems was negative except as noted.        Objective:   BP (!) 162/97   Pulse 66   Wt 174 lb (78.9 kg)   LMP 03/09/2007   SpO2 98%   BMI 29.87 kg/m    General appearance: alert, appears stated age and cooperative  Head: Normocephalic, without obvious abnormality, atraumatic  Eyes: conjunctivae/corneas clear. PERRL, EOM's intact. Fundi benign.  Lungs: clear to auscultation bilaterally  Heart: regular rate and rhythm, S1, S2 normal, no murmur, click, rub or gallop  Extremities: extremities normal, atraumatic, no cyanosis or edema  Pulses: 2+ and symmetric  Skin: single warts noted on tip of the right middle finger. Size range is 2 mm.   Neurologic: Grossly normal

## 2021-06-03 VITALS
HEART RATE: 75 BPM | WEIGHT: 174 LBS | BODY MASS INDEX: 29.87 KG/M2 | OXYGEN SATURATION: 100 % | SYSTOLIC BLOOD PRESSURE: 123 MMHG | DIASTOLIC BLOOD PRESSURE: 76 MMHG

## 2021-06-03 VITALS — HEIGHT: 64 IN | WEIGHT: 174 LBS | BODY MASS INDEX: 29.71 KG/M2

## 2021-06-03 VITALS
OXYGEN SATURATION: 98 % | SYSTOLIC BLOOD PRESSURE: 162 MMHG | WEIGHT: 174 LBS | HEART RATE: 66 BPM | BODY MASS INDEX: 29.87 KG/M2 | DIASTOLIC BLOOD PRESSURE: 97 MMHG

## 2021-06-03 NOTE — PROGRESS NOTES
Assessment:   1. Hypertension  Blood pressures well controlled and meeting goal of less than 140/90 mmHg per JNC 8 hypertension guidelines.  Patient will continue with current medications and follow-up in 3 months for recheck.  - amLODIPine (NORVASC) 5 MG tablet; Take 1 tablet (5 mg total) by mouth daily.  Dispense: 90 tablet; Refill: 3    2. Common wart   1. Liquid nitrogen was applied to right finger wart(s) for 3 second freeze/thaw cycles.  2. The patient will return at 2-4 week intervals for retreatment's as needed.     Plan:   Medication: no change.  Dietary sodium restriction.  Regular aerobic exercise.  Check blood pressures weekly and record.  Follow up: 3 months and as needed.     Subjective:   Patient here for follow-up of elevated blood pressure.  She is exercising and is adherent to a low-salt diet.  Blood pressure is well controlled at home. Cardiac symptoms: none. Patient denies: chest pain, chest pressure/discomfort, claudication, dyspnea, exertional chest pressure/discomfort, fatigue, irregular heart beat, orthopnea, palpitations, syncope and tachypnea. Cardiovascular risk factors: advanced age (older than 55 for men, 65 for women) and hypertension. Use of agents associated with hypertension: none. History of target organ damage: none.    The following portions of the patient's history were reviewed and updated as appropriate: allergies, current medications, past family history, past medical history, past social history, past surgical history and problem list.    Review of Systems  A 12 point comprehensive review of systems was negative except as noted.        Objective:   /76   Pulse 75   Wt 174 lb (78.9 kg)   LMP 03/09/2007   SpO2 100%   BMI 29.87 kg/m    General appearance: alert, appears stated age and cooperative  Head: Normocephalic, without obvious abnormality, atraumatic  Neck: no adenopathy, no carotid bruit, no JVD, supple, symmetrical, trachea midline and thyroid not enlarged,  symmetric, no tenderness/mass/nodules  Lungs: clear to auscultation bilaterally  Heart: regular rate and rhythm, S1, S2 normal, no murmur, click, rub or gallop  Extremities: extremities normal, atraumatic, no cyanosis or edema, Wart in right middle finger.  Pulses: 2+ and symmetric  Neurologic: Grossly normal

## 2021-06-04 VITALS
BODY MASS INDEX: 28.67 KG/M2 | HEART RATE: 62 BPM | OXYGEN SATURATION: 97 % | DIASTOLIC BLOOD PRESSURE: 83 MMHG | RESPIRATION RATE: 16 BRPM | TEMPERATURE: 98.2 F | WEIGHT: 167 LBS | SYSTOLIC BLOOD PRESSURE: 124 MMHG

## 2021-06-04 VITALS
DIASTOLIC BLOOD PRESSURE: 81 MMHG | TEMPERATURE: 98.7 F | WEIGHT: 170.6 LBS | BODY MASS INDEX: 29.28 KG/M2 | OXYGEN SATURATION: 95 % | SYSTOLIC BLOOD PRESSURE: 125 MMHG | HEART RATE: 81 BPM

## 2021-06-04 VITALS
OXYGEN SATURATION: 100 % | BODY MASS INDEX: 28.91 KG/M2 | SYSTOLIC BLOOD PRESSURE: 126 MMHG | DIASTOLIC BLOOD PRESSURE: 77 MMHG | HEART RATE: 85 BPM | WEIGHT: 168.4 LBS

## 2021-06-04 VITALS
HEART RATE: 84 BPM | TEMPERATURE: 98.4 F | SYSTOLIC BLOOD PRESSURE: 128 MMHG | DIASTOLIC BLOOD PRESSURE: 78 MMHG | OXYGEN SATURATION: 99 %

## 2021-06-04 NOTE — PATIENT INSTRUCTIONS - HE
Before Your Surgery       Call your surgeon if there is any change in your health. This includes signs of a cold or flu (such as a sore throat, runny nose, cough, rash or fever).       Do not smoke, drink alcohol or take over the counter medicine (unless your surgeon or primary care doctor tells you to) for the 24 hours before and after surgery.       If you take prescribed drugs: Follow your doctor s orders about which medicines to take and which to stop until after surgery.      Eating and drinking prior to surgery: follow the instructions from your surgeon.      Take a shower or bath the night before surgery. Use the soap your surgeon gave you to gently clean your skin. If you do not have soap from your surgeon, use your regular soap. Do not shave or scrub the surgery site. Wear clean pajamas and have clean sheets on your bed.             Hold all supplements, aspirin and NSAIDs for 7 days prior to surgery.    Follow your surgeon's direction on when to stop eating and drinking prior to surgery.    Your surgeon will be managing your pain after your surgery.

## 2021-06-04 NOTE — PROGRESS NOTES
Preoperative Exam    Scheduled Procedure: Right Knee Replacement  Surgery Date:  1/9/20  Surgery Location: [facility], fax [phone]    Surgeon:      Assessment/Plan:   1. Encounter for preoperative examination for general surgical procedure  2. Primary osteoarthritis of right knee  - Hemoglobin  - Basic Metabolic Panel  - Urinalysis-UC if Indicated  - INR  - Electrocardiogram Perform and Read  - INR    Surgical Procedure Risk: Low (reported cardiac risk generally < 1%)  Have you had prior anesthesia?: No  Have you or any family members had a previous anesthesia reaction:  No  Do you or any family members have a history of a clotting or bleeding disorder?: Yes: father had a clotting disorder  Cardiac Risk Assessment: no increased risk for major cardiac complications    APPROVAL GIVEN to proceed with proposed procedure, without further diagnostic evaluation    Functional Status: Independent  Patient plans to recover at home with family.     Subjective:      Catarina Mendenhall is a 69 y.o. female who presents for a preoperative consultation.  History of significant right knee pain that has not responded to cortison injection and over time the pain has increased and despite the cortisone shot as well as activity modification she remains symptomatic with a deep aching pain in the medial aspect of her knee and also in the patellofemoral region.  She also does have pain at rest and her activities has decreased significantly because of the ongoing pain.  Dr. Rubén Anderson of [facility] did review patient's x-ray and MRI scan which did show advanced right knee medial and patellofemoral  compartment degenerative arthrosis.  Treatment options were discussed with patient and patient did lean to was wanting to pursue a right total knee arthroplasty in light of the ongoing discomfort.  The risk and benefits of surgery were reviewed with the patient during her visit with   Justin at [facility].  Patient denies chest pain, shortness of breath, syncope, fever and chills.    All other systems reviewed and are negative, other than those listed in the HPI.    Pertinent History  Do you have difficulty breathing or chest pain after walking up a flight of stairs: Yes: out of breath sometimes  History of obstructive sleep apnea: No  Steroid use in the last 6 months: Yes: cortisone injection in Sept 2019  Frequent Aspirin/NSAID use: No  Prior Blood Transfusion: No  Prior Blood Transfusion Reaction: No  If for some reason prior to, during or after the procedure, if it is medically indicated, would you be willing to have a blood transfusion?:  There is no transfusion refusal.    Current Outpatient Medications   Medication Sig Dispense Refill     amLODIPine (NORVASC) 5 MG tablet Take 1 tablet (5 mg total) by mouth daily. 90 tablet 3     hydroCHLOROthiazide (HYDRODIURIL) 25 MG tablet Take 1 tablet (25 mg total) by mouth daily. 90 tablet 3     losartan (COZAAR) 100 MG tablet Take 1 tablet (100 mg total) by mouth daily. 90 tablet 3     vit A/vit C/vit E/zinc/copper (PRESERVISION AREDS ORAL) Take by mouth.       No current facility-administered medications for this visit.         No Known Allergies    Patient Active Problem List   Diagnosis     Urge And Stress Incontinence     Hypertension     Adenomatous colon polyp (03/2014)       Past Medical History:   Diagnosis Date     Hypertension      Urge incontinence        Past Surgical History:   Procedure Laterality Date     NO PAST SURGERIES         Social History     Socioeconomic History     Marital status:      Spouse name: Not on file     Number of children: Not on file     Years of education: Not on file     Highest education level: Not on file   Occupational History     Not on file   Social Needs     Financial resource strain: Not on file     Food insecurity:     Worry: Not on file     Inability: Not on file     Transportation needs:      Medical: Not on file     Non-medical: Not on file   Tobacco Use     Smoking status: Former Smoker     Last attempt to quit: 2015     Years since quittin.0     Smokeless tobacco: Never Used     Tobacco comment: 1-2 cigarrettes  a month   Substance and Sexual Activity     Alcohol use: Not on file     Drug use: No     Sexual activity: Yes     Partners: Male   Lifestyle     Physical activity:     Days per week: Not on file     Minutes per session: Not on file     Stress: Not on file   Relationships     Social connections:     Talks on phone: Not on file     Gets together: Not on file     Attends Druze service: Not on file     Active member of club or organization: Not on file     Attends meetings of clubs or organizations: Not on file     Relationship status: Not on file     Intimate partner violence:     Fear of current or ex partner: Not on file     Emotionally abused: Not on file     Physically abused: Not on file     Forced sexual activity: Not on file   Other Topics Concern     Not on file   Social History Narrative     Not on file       Patient Care Team:  Sally Garrett FNP as PCP - General (Nurse Practitioner)  Sally Garrett FNP as Assigned PCP          Objective:     Vitals:    20 1450   BP: 126/77   Pulse: 85   SpO2: 100%   Weight: 168 lb 6.4 oz (76.4 kg)   LMP: 2007         Physical Exam:  /77   Pulse 85   Wt 168 lb 6.4 oz (76.4 kg)   LMP 2007   SpO2 100%   BMI 28.91 kg/m    General appearance: alert, appears stated age and cooperative  Head: Normocephalic, without obvious abnormality, atraumatic  Eyes: conjunctivae/corneas clear. PERRL, EOM's intact. Fundi benign.  Throat: lips, mucosa, and tongue normal; teeth and gums normal  Neck: no adenopathy, no carotid bruit, no JVD, supple, symmetrical, trachea midline and thyroid not enlarged, symmetric, no tenderness/mass/nodules  Lungs: clear to auscultation bilaterally  Heart: regular rate and rhythm, S1,  S2 normal, no murmur, click, rub or gallop  Abdomen: soft, non-tender; bowel sounds normal; no masses,  no organomegaly  Extremities: extremities normal, atraumatic, no cyanosis or edema  Pulses: 2+ and symmetric  Skin: Skin color, texture, turgor normal. No rashes or lesions  Lymph nodes: Cervical, supraclavicular, and axillary nodes normal.  Neurologic: Grossly normal      Patient Instructions      Before Your Surgery       Call your surgeon if there is any change in your health. This includes signs of a cold or flu (such as a sore throat, runny nose, cough, rash or fever).       Do not smoke, drink alcohol or take over the counter medicine (unless your surgeon or primary care doctor tells you to) for the 24 hours before and after surgery.       If you take prescribed drugs: Follow your doctor s orders about which medicines to take and which to stop until after surgery.      Eating and drinking prior to surgery: follow the instructions from your surgeon.      Take a shower or bath the night before surgery. Use the soap your surgeon gave you to gently clean your skin. If you do not have soap from your surgeon, use your regular soap. Do not shave or scrub the surgery site. Wear clean pajamas and have clean sheets on your bed.             Hold all supplements, aspirin and NSAIDs for 7 days prior to surgery.    Follow your surgeon's direction on when to stop eating and drinking prior to surgery.    Your surgeon will be managing your pain after your surgery.        EKG: Normal sinus rhythm   Normal ECG   When compared with ECG of 31-OCT-2017 08:43,   No significant change was found    Labs:  Recent Results (from the past 240 hour(s))   Electrocardiogram Perform and Read    Collection Time: 01/02/20  3:23 PM   Result Value Ref Range    SYSTOLIC BLOOD PRESSURE      DIASTOLIC BLOOD PRESSURE      VENTRICULAR RATE 73 BPM    ATRIAL RATE 73 BPM    P-R INTERVAL 184 ms    QRS DURATION 86 ms    Q-T INTERVAL 402 ms    QTC  CALCULATION (BEZET) 442 ms    P Axis 46 degrees    R AXIS 2 degrees    T AXIS 18 degrees    MUSE DIAGNOSIS       Normal sinus rhythm  Normal ECG  When compared with ECG of 31-OCT-2017 08:43,  No significant change was found      and Labs pending at this time.  Results will be reviewed when available.    Immunization History   Administered Date(s) Administered     Influenza high dose,seasonal,PF, 65+ yrs 10/31/2017     Influenza, inj, historic,unspecified 10/17/2019     Influenza,seasonal quad, PF, =/> 6months 12/17/2018     Pneumo Conj 13-V (2010&after) 03/16/2017, 12/17/2018     Tdap 06/16/2009           Electronically signed by NAV Spivey 01/02/20 2:42 PM

## 2021-06-04 NOTE — PROGRESS NOTES
Walk In Care Note                                                        Date of Visit: 12/20/2019     Chief Complaint   Catarina Mendenhall is a(n) 69 y.o. White or  female who presents to [facility] with the following complaint(s):  Ear Pain and Sore Throat       Assessment and Plan   1. Viral pharyngitis  - Rapid Strep A Screen-Throat  - Group A Strep, RNA Direct Detection, Throat      Strep screen is negative. Reflex strep testing is in process; will prescribe amoxicillin if positive. Discussed symptomatic/supportive cares, including rest, hydration, and use of alternating doses of over-the-counter acetaminophen and ibuprofen as needed to manage fever and discomfort.     Counseled patient regarding assessment and plan for evaluation and treatment. Questions were answered. See AVS for the specific written instructions and educational handout(s) regarding pharyngitis that were provided at the conclusion of the visit.     Discussed signs / symptoms that warrant urgent / emergent medical attention.     Follow up in 3 days if symptoms persist.     History of Present Illness   Primary symptom: Sore throat  Onset: 5 days ago  Progression: Persisting, right-sided  Hoarseness: No  Dysphagia: Yes  Fevers: No  Chills: No  Upper respiratory symptoms: Has mild nasal congestion. No rhinorrhea. No cough. Right ear is painful. No left ear pain.   Associated headache: Mild  Associated rash: No  Associated abdominal pain: No  Additional symptoms: None  Home therapies utilized: Ibuprofen  History of recurrent strep: No  Exposure to strep: No     Review of Systems   Review of Systems   All other systems reviewed and are negative.       Physical Exam   Vitals:    12/20/19 0748   BP: 124/83   Pulse: 62   Resp: 16   Temp: 98.2  F (36.8  C)   SpO2: 97%   Weight: 167 lb (75.8 kg)     Physical Exam  Vitals signs and nursing note reviewed.   Constitutional:       General: She is not in acute distress.     Appearance: She is  well-developed and normal weight. She is not ill-appearing or toxic-appearing.   HENT:      Head: Normocephalic and atraumatic.      Right Ear: Tympanic membrane, ear canal and external ear normal.      Left Ear: Tympanic membrane, ear canal and external ear normal.      Nose: No mucosal edema or rhinorrhea.      Right Sinus: No maxillary sinus tenderness or frontal sinus tenderness.      Left Sinus: No maxillary sinus tenderness or frontal sinus tenderness.      Mouth/Throat:      Mouth: Mucous membranes are moist. No oral lesions.      Pharynx: Uvula midline. Posterior oropharyngeal erythema present. No oropharyngeal exudate.      Tonsils: No tonsillar exudate. Swellin+ on the right. 1+ on the left.   Eyes:      General: Lids are normal.      Conjunctiva/sclera: Conjunctivae normal.   Neck:      Musculoskeletal: Neck supple. No edema or erythema.   Cardiovascular:      Rate and Rhythm: Normal rate and regular rhythm.      Heart sounds: S1 normal and S2 normal. No murmur. No friction rub. No gallop.    Pulmonary:      Effort: Pulmonary effort is normal.      Breath sounds: Normal breath sounds. No stridor. No wheezing, rhonchi or rales.   Lymphadenopathy:      Head:      Right side of head: Tonsillar adenopathy present.      Left side of head: Tonsillar adenopathy present.      Cervical: No cervical adenopathy.   Skin:     General: Skin is warm and dry.      Coloration: Skin is not pale.      Findings: No rash.   Neurological:      General: No focal deficit present.      Mental Status: She is alert and oriented to person, place, and time.          Diagnostic Studies   Laboratory:  Results for orders placed or performed in visit on 19   Rapid Strep A Screen-Throat   Result Value Ref Range    Rapid Strep A Antigen No Group A Strep detected, presumptive negative No Group A Strep detected, presumptive negative     Radiology:  N/A  Electrocardiogram:  N/A     Procedure Note   N/A     Pertinent History   The  following portions of the patient's history were reviewed and updated as appropriate: allergies, current medications, past family history, past medical history, past social history, past surgical history and problem list.    Patient has Urge And Stress Incontinence; Hypertension; and Adenomatous colon polyp (03/2014) on their problem list.    Patient has a past medical history of Hypertension and Urge incontinence.    Patient has a past surgical history that includes No past surgeries.    Patient's family history includes Cancer in her paternal grandmother; Clotting disorder in her father; Heart attack in her father; Hypertension in her mother.    Patient reports that she quit smoking about 4 years ago. She has never used smokeless tobacco. She reports that she does not use drugs.     Portions of this note have been dictated using voice recognition software. Any grammatical or context distortions are unintentional and inherent to the software.     Karlos Talamantes MD  [facility]

## 2021-06-05 ENCOUNTER — RECORDS - HEALTHEAST (OUTPATIENT)
Dept: LAB | Facility: CLINIC | Age: 71
End: 2021-06-05

## 2021-06-05 VITALS
WEIGHT: 168 LBS | HEIGHT: 65 IN | OXYGEN SATURATION: 100 % | HEART RATE: 74 BPM | BODY MASS INDEX: 27.99 KG/M2 | DIASTOLIC BLOOD PRESSURE: 86 MMHG | SYSTOLIC BLOOD PRESSURE: 139 MMHG

## 2021-06-05 DIAGNOSIS — R06.02 SHORTNESS OF BREATH: ICD-10-CM

## 2021-06-05 NOTE — PROGRESS NOTES
Assessment:   1. Urinary pain  Shared urinalysis result with patient. Recommend that she increase hydration and follow up if symptom persist.   - Urinalysis-UC if Indicated    2. Decreased GFR  Recommend rechecking renal function and potassium level today to compare with previous result.   - Basic Metabolic Panel     Plan:   1. Medications: not indicated at this time  2. Maintain adequate hydration  3. Follow up if symptoms not improving, and prn.     Subjective:   Catarina Mendenhall is a 69 y.o. female who complains of burning with urination and dysuria for 3 days.  Patient also complains of no other symptoms. Patient denies back pain, congestion, cough, fever, headache, rhinitis, sorethroat and stomach ache.  Patient does not have a history of recurrent UTI.  Patient does not have a history of pyelonephritis. Patient just had knee surgery 4 days ago.    The following portions of the patient's history were reviewed and updated as appropriate: allergies, current medications, past family history, past medical history, past social history, past surgical history and problem list.  Review of Systems  A 12 point comprehensive review of systems was negative except as noted.      Objective:      [facility] 03/09/2007   General: alert, appears stated age and cooperative   Abdomen: soft, non-tender, without masses or organomegaly    Back: back muscles are full ROM   : defer exam     Laboratory:   Urine dipstick shows negative for all components.      No

## 2021-06-09 NOTE — PROGRESS NOTES
ASSESSMENT:  1. Physical exam, annual  P pneumonia vaccination series begun.  Otherwise appears to be under good control.  - Pneumococcal conjugate vaccine 13-valent 6wks-17yrs; >50yrs    2. Essential hypertension  Well-controlled at this time with current medications.  She will continue to monitor as an outpatient and if blood pressures remain too high, she will return to see me.  - Comprehensive Metabolic Panel    3. Adenomatous colon polyp (2014)  Needs repeat colonoscopy 2019.  She did have a polyp in 2014.    4. Spells  Uncertain what these 10-15 minutes spells are.  It could be tachycardia palpitations versus dehydration versus migraine equivalents.  She'll monitor symptoms and looking all what happens.  Nothing concerning on Exam nor in Discussion That Required Urgent Workup.    PLAN:  Patient Instructions   My recommendation is you have the shingles vaccination completed somewhere.    Vitamin D3 - 2000 IU daily.    Breast exam yearly by someone. And yearly mammogram.     Check your blood pressures a couple of times per week.     Recommend an Omron wrist blood pressure cuff if you are interested in buying one.       Orders Placed This Encounter   Procedures     Pneumococcal conjugate vaccine 13-valent 6wks-17yrs; >50yrs     Comprehensive Metabolic Panel     Medications Discontinued During This Encounter   Medication Reason     ibuprofen (ADVIL,MOTRIN) 200 MG tablet Therapy completed     multivitamin Liqd solution Therapy completed     VIT A/VIT C/VIT E/ZINC/COPPER (ICAPS AREDS ORAL) Therapy completed       Return in about 6 months (around 9/16/2017) for HTN.    ASSESSED PROBLEMS:  Problem List Items Addressed This Visit     Hypertension    Relevant Orders    Comprehensive Metabolic Panel    Adenomatous colon polyp (03/2014)      Other Visit Diagnoses     Physical exam, annual    -  Primary    Relevant Orders    Pneumococcal conjugate vaccine 13-valent 6wks-17yrs; >50yrs (Completed)    Spells               CHIEF COMPLAINT:  Chief Complaint   Patient presents with     Annual Exam       HISTORY OF PRESENT ILLNESS:  Catarina Mendenhall is a 66 y.o. female presenting to the clinic today for an annual physical exam. She refuses to get into a gown for her visit today.     Head Pressure: She has occasional episodes where she does not feel right. She is able to sense the episodes starting. She denies blurred vision or dizziness, but she does feel out of sorts. She also experiences a pressure in her head. The episode will last 10-15 minutes. She states that afterward it feels like she has been through some sort of trauma and she feels wiped out. The episodes occur roughly once monthly. She denies any relation of her episodes to food she has eaten. She does not check her pulse during the episodes. When the episode is occurring she does her best to find somewhere to sit down. Sometimes she will drink water. She denies any relation to hot flashes.     Stress Incontinence: She was seen by uro-gynecologist for her stress incontinence. She has tried doing Kegel exercises. She will be starting physical therapy for her incontinence next week. She does not remember if she was told that she has an vaginal or bladder prolapse. She will have her urologist send the office notes to our office. She mentions that to her it feels like she is carrying a lot of fluid around and there will be particular days where she urinates more than she would on a typical day. So, she has fluctuating days of increased urinary frequency. She denies any urinary urgency.     Health maintenance: She had a colonoscopy completed in 03/2014 and a tubular adenoma was removed; she is on a 5-year screening plan. She had a mammogram completed in 3/30/15 and it was negative. She has her gynecological exams completed at [facility]; her last was in December 2016. She received the PCV 13 vaccination today, otherwise all of her immunizations are up to date at this  "time. She had a DXA completed on 12/7/11 and it showed a low t-score of -1.0 in her femur. She sees dermatology regularly. She has an eye exam completed on a regular basis; she was told she has dry macular degeneration.     REVIEW OF SYSTEMS:   She had a cortisone injection in her right knee through [facility] in December 2016. She has had plantar fasciitis in the past and she makes sure she is wearing supportive shoes. She checks her blood pressures on occasion and the systolic will sometimes be around 150. She denies any chest pain, tightness or pressure in the chest, or shortness of breath with exertion.   See completed form B. Comprehensive review of systems negative except as noted above.    PFSH:  History reviewed. No pertinent past medical history.  Past Surgical History:   Procedure Laterality Date     NO PAST SURGERIES       Family History   Problem Relation Age of Onset     Cancer Paternal Grandmother      Unsure what kind of cancer.  Age in 40's     Hypertension Mother      Heart attack Father      Social History     Social History     Marital status:      Spouse name: N/A     Number of children: N/A     Years of education: N/A     Occupational History     Not on file.     Social History Main Topics     Smoking status: Light Tobacco Smoker     Smokeless tobacco: Never Used     Alcohol use Not on file     Drug use: No     Sexual activity: Yes     Partners: Male     Other Topics Concern     Not on file     Social History Narrative       VITALS:  Vitals:    03/16/17 1501   BP: 124/62   Pulse: 64   Weight: 170 lb 14.4 oz (77.5 kg)   Height: 5' 4.6\" (1.641 m)     Wt Readings from Last 3 Encounters:   03/16/17 170 lb 14.4 oz (77.5 kg)   12/02/16 172 lb 3.2 oz (78.1 kg)   07/22/15 164 lb (74.4 kg)     Body mass index is 28.79 kg/(m^2).    PHYSICAL EXAM:  General Appearance: Alert, cooperative, no distress, appears stated age.  HEENT: EMOI, fundi not observed, TMs normal, mouth and throat without " lesions.  Neck: Supple without adenopathy or thyromegaly.  Back: No CVA tenderness or spinous process pain.  Lungs: Clear to auscultation bilaterally, good air movement.  Heart: Regular rate and rhythm, S1 and S2 normal, no murmur or bruit.  Abdomen: Soft, non-tender, no HSM or masses.  Breast: Not done, completed by GYN.   GYN: Not done, completed by GYN.   Musculoskeletal: No gross abnormalities.  Extremities: No CCE, pulses II/IV and symmetric.  Skin: Not done, pt refused to wear a gown for exam but has been seen by derm recently.   Lymph nodes: Cervical, supraclavicular, groin, and axillary nodes normal.  Neurologic: CNII-XII intact, strength V/V and symmetric, DTRs II/IV and symmetric, sensory grossly intact  Psychiatric: She has a normal mood and affect.     ADDITIONAL HISTORY SUMMARIZED (FROM OLD RECORDS OR HISTORY FROM SOMEONE OTHER THAN THE PATIENT OR ANOTHER HEALTHCARE PROVIDER) (2 TOTAL): Summarized [facility] note from 12/05/16; cortisone injection in knee.    DECISION TO OBTAIN EXTRA INFORMATION (OLD RECORDS REQUESTED OR HISTORY FROM ANOTHER PERSON OR ACCESSING CARE EVERYWHERE) (1 TOTAL): None.     RADIOLOGY TESTS SUMMARIZED OR ORDERED (XRAY/CT/MRI/DXA) (1 TOTAL): Summarized DXA from 12/7/11; overall low risk.    LABS REVIEWED OR ORDERED (1 TOTAL): Ordered CMP today. Reviewed lipid lab from 6/2014. Reviewed labs from 12/2/16: HM2, CRP.    MEDICINE TESTS SUMMARIZED OR ORDERED (EKG/ECHO/COLONOSCOPY/EGD) (1 TOTAL): None.    INDEPENDENT REVIEW OF EKG OR X-RAY (2 EACH): None.     The visit lasted a total of 29 minutes face to face with the patient. Over 50% of the time was spent counseling and educating the patient about health maintenance.    IKathy, am scribing for and in the presence of, Dr. Horan.    I, Dr. Horan, personally performed the services described in this documentation, as scribed by Kathy Preston in my presence, and it is both accurate and  complete.    MEDICATIONS:  Current Outpatient Prescriptions   Medication Sig Dispense Refill     aspirin 81 MG tablet Take 81 mg by mouth daily.       chlorthalidone (HYGROTEN) 25 MG tablet Take 0.5 tablets (12.5 mg total) by mouth daily. 45 tablet 3     No current facility-administered medications for this visit.        Total data points: 4

## 2021-06-10 NOTE — PROGRESS NOTES
Assessment:   1. Hypertension  Blood pressure is well controlled and meeting goal of <140/90 mm Hg per JNC-8 hypertension guidelines. Patient was encouraged to check her blood pressure when she has the dizzy feeling again and contact the clinic. We also discussed possibly decreasing the losartan if symptom persist.   Medication: no change.  Dietary sodium restriction.  Regular aerobic exercise.  Check blood pressures weekly and record.  Follow up: 2 months and as needed.     Subjective:   Patient here for follow-up of elevated blood pressure.  She is exercising and is adherent to a low-salt diet.  Blood pressure is well controlled at home. Patient reports feeling a little dizzy sometimes and she is not sure if her blood pressure is too low. Cardiac symptoms: none. Patient denies: chest pain, chest pressure/discomfort, dyspnea, exertional chest pressure/discomfort, irregular heart beat, near-syncope, orthopnea, paroxysmal nocturnal dyspnea, syncope and tachypnea. Cardiovascular risk factors: advanced age (older than 55 for men, 65 for women) and hypertension. Use of agents associated with hypertension: none. History of target organ damage: none.    The following portions of the patient's history were reviewed and updated as appropriate: allergies, current medications, past family history, past medical history, past social history, past surgical history and problem list.    Review of Systems  A 12 point comprehensive review of systems was negative except as noted.        Objective:   /81   Pulse 81   Temp 98.7  F (37.1  C) (Oral)   Wt 170 lb 9.6 oz (77.4 kg)   LMP 03/09/2007   SpO2 95%   BMI 29.28 kg/m    General appearance: alert, appears stated age and cooperative  Head: Normocephalic, without obvious abnormality, atraumatic  Extremities: extremities normal, atraumatic, no cyanosis or edema  Pulses: 2+ and symmetric  Neurologic: Grossly normal

## 2021-06-13 NOTE — PROGRESS NOTES
Assessment and Plan:     Patient has been advised of split billing requirements and indicates understanding: Yes  1. Routine general medical examination at a health care facility  Healthy female exam  - Lipid C[place], FASTING; Future  - Basic Metabolic Panel; Future  - Hemoglobin; Future    2. Dysuria  Discussed possible diagnosis and treatment.   - Urinalysis-UC if Indicated    3. Nail fungal infection  Discussed diagnosis and treatment plan.  - efinaconazole 10 % Brett; Apply 1 application topically daily.  Dispense: 8 mL; Refill: 6    4. Encounter for screening for lipoid disorders  - Lipid [place], FASTING; Future    5. Need for vaccination  - Influenza,Quad,High Dose,PF 65 YR+     The patient's current medical problems were reviewed.    I have had an Advance Directives discussion with the patient.  The following health maintenance schedule was reviewed with the patient and provided in printed form in the after visit summary:   Health Maintenance   Topic Date Due     HEPATITIS C SCREENING  1950     ZOSTER VACCINES (1 of 2) 09/05/2001     DXA SCAN  12/07/2016     TD 18+ HE  06/16/2019     Pneumococcal Vaccine: 65+ Years (2 of 2 - PPSV23) 12/17/2019     ADVANCE CARE PLANNING  07/22/2020     MAMMOGRAM  03/25/2021     MEDICARE ANNUAL WELLNESS VISIT  11/19/2021     FALL RISK ASSESSMENT  11/19/2021     LIPID  10/22/2024     COLORECTAL CANCER SCREENING  10/30/2029     INFLUENZA VACCINE RULE BASED  Completed     Pneumococcal Vaccine: Pediatrics (0 to 5 Years) and At-Risk Patients (6 to 64 Years)  Aged Out     HEPATITIS B VACCINES  Aged Out        Subjective:   Chief Complaint: Catarina Mendenhall is an 70 y.o. female here for an Annual Wellness visit.   HPI:  Patient reports that she has had urinary symptoms, mostly frequency and its not constant. Patient also report right big toe nail pain. She reports that she has had symptoms for few weeks. She denied any fall or injuries.     Review of Systems:  Please see  above.  The rest of the review of systems are negative for all systems.    Patient Care Team:  Sally Garrett FNP as PCP - General (Nurse Practitioner)  Sally Garrett FNP as Assigned PCP     Patient Active Problem List   Diagnosis     Urge And Stress Incontinence     Hypertension     Adenomatous colon polyp (2014)     Past Medical History:   Diagnosis Date     Hypertension      Urge incontinence       Past Surgical History:   Procedure Laterality Date     NO PAST SURGERIES        Family History   Problem Relation Age of Onset     Cancer Paternal Grandmother         Unsure what kind of cancer.  Age in 40's     Hypertension Mother      Heart attack Father      Clotting disorder Father       Social History     Socioeconomic History     Marital status:      Spouse name: Not on file     Number of children: Not on file     Years of education: Not on file     Highest education level: Not on file   Occupational History     Not on file   Social Needs     Financial resource strain: Not on file     Food insecurity     Worry: Not on file     Inability: Not on file     Transportation needs     Medical: Not on file     Non-medical: Not on file   Tobacco Use     Smoking status: Former Smoker     Quit date: 2015     Years since quittin.9     Smokeless tobacco: Never Used     Tobacco comment: 1-2 cigarrettes  a month   Substance and Sexual Activity     Alcohol use: Not on file     Drug use: No     Sexual activity: Yes     Partners: Male   Lifestyle     Physical activity     Days per week: Not on file     Minutes per session: Not on file     Stress: Not on file   Relationships     Social connections     Talks on phone: Not on file     Gets together: Not on file     Attends Baptism service: Not on file     Active member of club or organization: Not on file     Attends meetings of clubs or organizations: Not on file     Relationship status: Not on file     Intimate partner violence     Fear of current  "or ex partner: Not on file     Emotionally abused: Not on file     Physically abused: Not on file     Forced sexual activity: Not on file   Other Topics Concern     Not on file   Social History Narrative     Not on file      Current Outpatient Medications   Medication Sig Dispense Refill     amLODIPine (NORVASC) 5 MG tablet Take 1 tablet (5 mg total) by mouth daily. 90 tablet 3     hydroCHLOROthiazide (HYDRODIURIL) 25 MG tablet Take 1 tablet (25 mg total) by mouth daily. (Patient taking differently: Take 12.5 mg by mouth daily. ) 90 tablet 3     hydrOXYzine HCl (ATARAX) 25 MG tablet 25 mg.       ketorolac (TORADOL) 10 mg tablet Take 10 mg by mouth 4 (four) times a day as needed.       vit A/vit C/vit E/zinc/copper (PRESERVISION AREDS ORAL) Take by mouth.       losartan (COZAAR) 100 MG tablet Take 1 tablet (100 mg total) by mouth daily. 90 tablet 3     No current facility-administered medications for this visit.       Objective:   Vital Signs:   Visit Vitals  /86   Pulse 74   Ht 5' 5\" (1.651 m)   Wt 168 lb (76.2 kg)   LMP 03/09/2007   SpO2 100%   BMI 27.96 kg/m           VisionScreening:  No exam data present     PHYSICAL EXAM  /86   Pulse 74   Ht 5' 5\" (1.651 m)   Wt 168 lb (76.2 kg)   LMP 03/09/2007   SpO2 100%   BMI 27.96 kg/m    General appearance: alert, appears stated age and cooperative  Head: Normocephalic, without obvious abnormality, atraumatic  Eyes: conjunctivae/corneas clear. PERRL, EOM's intact. Fundi benign.  Ears: normal TM's and external ear canals both ears  Throat: lips, mucosa, and tongue normal; teeth and gums normal  Neck: no adenopathy, no carotid bruit, no JVD, supple, symmetrical, trachea midline and thyroid not enlarged, symmetric, no tenderness/mass/nodules  Back: symmetric, no curvature. ROM normal. No CVA tenderness.  Lungs: clear to auscultation bilaterally  Heart: regular rate and rhythm, S1, S2 normal, no murmur, click, rub or gallop  Abdomen: soft, non-tender; bowel " sounds normal; no masses,  no organomegaly  Extremities: extremities normal, atraumatic, no cyanosis or edema. Bilateral big toenail with fungi   Pulses: 2+ and symmetric  Skin: Skin color, texture, turgor normal. No rashes or lesions  Lymph nodes: Cervical, supraclavicular, and axillary nodes normal.  Neurologic: Grossly normal      Assessment Results 11/19/2020   Activities of Daily Living No help needed   Instrumental Activities of Daily Living No help needed   Get Up and Go Score Less than 12 seconds   Mini Cog Total Score 5   Some recent data might be hidden     A Mini-Cog score of 0-2 suggests the possibility of dementia, score of 3-5 suggests no dementia  Fall Risk not completed for medical reasons.    Identified Health Risks:     The patient was counseled and encouraged to consider modifying their diet and eating habits. She was provided with information on recommended healthy diet options.  Information on urinary incontinence and treatment options given to patient.  Information regarding advance directives (living valdez), including where she can download the appropriate form, was provided to the patient via the AVS.

## 2021-06-13 NOTE — PROGRESS NOTES
Dear Dr. Drew Horan Md  [address] [place] [address]    Thank you for the opportunity to participate in the care of Mrs. Catarina Mendenhall.    She is a 67 y.o. female who comes to the clinic for evaluation of her excessive daytime sleepiness.  The patient states that she has been having excessive daytime sleepiness for at least 5 years.  While she denies any episodes of witnessed apnea she has been told that she does snore.  She also complains of frequent nocturnal awakenings and difficulty reinitiating sleep if she wakes up.  She often requires taking Advil PM just to stay asleep.  She also occasionally wake up gagging especially if she sleeps on her back.  Her review of systems otherwise unremarkable.     Ideal Sleep-Wake Cycle(devoid of societal pressure):    Patient would try to initiate sleep at around 11 PM with a sleep latency of 15 minutes. The patient would have two nocturnal awakening. Final wake up time is around 6-7 AM.    Past Medical History  No past medical history on file.     Past Surgical History  Past Surgical History:   Procedure Laterality Date     NO PAST SURGERIES          Meds  Current Outpatient Prescriptions   Medication Sig Dispense Refill     aspirin 81 MG tablet Take 81 mg by mouth daily.       chlorthalidone (HYGROTEN) 25 MG tablet Take 0.5 tablets (12.5 mg total) by mouth daily. 45 tablet 3     LORazepam (ATIVAN) 0.5 MG tablet Take 1 tablet (0.5 mg total) by mouth every 8 (eight) hours as needed for anxiety. 10 tablet 0     No current facility-administered medications for this visit.         Allergies  Review of patient's allergies indicates no known allergies.     Social History  Social History     Social History     Marital status:      Spouse name: N/A     Number of children: N/A     Years of education: N/A     Occupational History     Not on file.     Social History Main Topics     Smoking status: Light Tobacco Smoker     Smokeless tobacco: Never Used       Comment: 1-2 cigarrettes  a month     Alcohol use Not on file     Drug use: No     Sexual activity: Yes     Partners: Male     Other Topics Concern     Not on file     Social History Narrative        Family History  Family History   Problem Relation Age of Onset     Cancer Paternal Grandmother      Unsure what kind of cancer.  Age in 40's     Hypertension Mother      Heart attack Father         Review of Systems:  Constitutional: Negative except as noted in HPI.   Eyes: Negative except as noted in HPI.   ENT: Negative except as noted in HPI.   Cardiovascular: Negative except as noted in HPI.   Respiratory: Negative except as noted in HPI.   Gastrointestinal: Negative except as noted in HPI.   Genitourinary: Negative except as noted in HPI.   Musculoskeletal: Negative except as noted in HPI.   Integumentary: Negative except as noted in HPI.   Neurological: Negative except as noted in HPI.   Psychiatric: Negative except as noted in HPI.   Endocrine: Negative except as noted in HPI.   Hematologic/Lymphatic: Negative except as noted in HPI.      STOP BANG 9/20/2017   Do you snore loudly (louder than talking or loud enough to be heard through closed doors)? 0   Has anyone observed you stop breathing in your sleep? 0   Do you have or are you being treated for high blood pressure? 1   BMI more than 35 kg/m2 0   Age over 50 years old? 1   Neck circumference greater than 16 inches? 0   Gender male? 0   Epworths Sleepiness Scale 9/20/2017   Sitting and reading 1   Watching TV 3   Sitting, inactive in a public place (e.g. a theatre or a meeting) 1   As a passenger in a car for an hour without a break 2   Lying down to rest in the afternoon when circumstances permit 2   Sitting and talking to someone 0   Sitting quietly after a lunch without alcohol 1   In a car, while stopped for a few minutes in traffic 0   Total score 10   Rooming 9/20/2017   Usual bedtime 11   Sleep Latency 15 mn   Awakenings 0-2   Wake Up Time 6  "  Energy Drinks 0   Coffee 3   Cola 0   Difficulty falling asleep No   Difficulty staying asleep Yes   Excessive daytime tiredness Yes   Excessive daytime sleepiness Yes   Dozing off while driving No   Shift Worker No   Sleep Walking? No   Sleep Talking? No   Kicking or punching? No   Restless legs symptoms No       Physical Exam:  /84  Pulse 66  Ht 5' 4\" (1.626 m)  Wt 170 lb 4.8 oz (77.2 kg)  SpO2 96%  BMI 29.23 kg/m2  BMI:Body mass index is 29.23 kg/(m^2).   GEN: NAD, appropriate for age  Head: Normocephalic.  EYES: PERRLA, EOMI  ENT: Oropharynx is clear, mallampatti class 3+ airway. Uvula is intact  Nasal mucosa is moist without erythema  Neck : Thyroid is within normal limits. Neck circ 12.5\"  CV: Regular rate and rhythm, S1 & S2 positive.  LUNGS: Bilateral breathsounds heard.   ABDOMEN: Positive bowel sounds in all quadrants, soft, no rebound or guarding  MUSCULOSKELETAL: Bilateral trace leg swelling  SKIN: warm, dry, no rashes  Neurological: Alert, oriented to time, place, and person.  Psych: normal mood, normal affect     Labs/Studies:     Lab Results   Component Value Date    WBC 7.4 12/02/2016    HGB 13.5 12/02/2016    HCT 40.6 12/02/2016    MCV 91 12/02/2016     12/02/2016         Chemistry        Component Value Date/Time     03/16/2017 1601    K 3.5 03/16/2017 1601     03/16/2017 1601    CO2 28 03/16/2017 1601    BUN 20 03/16/2017 1601    CREATININE 0.97 03/16/2017 1601    GLU 83 03/16/2017 1601        Component Value Date/Time    CALCIUM 9.6 03/16/2017 1601    ALKPHOS 84 03/16/2017 1601    AST 21 03/16/2017 1601    ALT 22 03/16/2017 1601    BILITOT 0.4 03/16/2017 1601            No results found for: FERRITIN  Lab Results   Component Value Date    TSH 1.59 06/25/2014         Assessment and Plan:  In summary Catarina Mendenhall is a 67 y.o. year old female here for sleep disturbance.  1.  Hypersomnia   Mrs. Catarina Mendenhall has high risk for obstructive sleep apnea based on " the history of hypersomnia, snoring and a crowded airway. I educated the patient on the underlying pathophysiology of obstructive sleep apnea. We reviewed the risks associated with sleep apnea, including increased cardiovascular risk and overall death. We talked about treatments briefly. I recommend getting an split-night nocturnal polysomnography. The patient should return to the clinic to discuss results and treatment option in a patient-centered approach.  2.  Snoring  3.  Other sleep disturbance  4.  Obesity    Patient verbalized understanding of these issues, agrees with the plan and all questions were answered today. Patient was given an opportuntity to voice any other symptoms or concerns not listed above. Patient did not have any other symptoms or concerns.      Patient told to return in one week after the sleep study is interpreted. Patient instructed to stop at  to schedule appointment before leaving today.     Tereso Mayo DO  Board Certified in Internal Medicine and Sleep Medicine  [facility].    (Note created with Dragon voice recognition and unintended spelling errors and word substitutions may occur)

## 2021-06-13 NOTE — PROGRESS NOTES
Clinic Note    Assessment:     Assessment and Plan:  1. Palpitations  These are odd symptoms not sure if it is skipped beat causing excessive feeling of pressure in the head but it is more than just a single beat.  No other symptoms to suggest aneurysm.  No other significant arrhythmia type symptoms.  ECG was normal.  Will check lab work and she will end up showing me her chart of her pulse during exercise and at rest.  Further workup will be determined according to what we find.  Possibilities include echo or MRI.  - Comprehensive Metabolic Panel  - Magnesium  - HM2(CBC w/o Differential)  - Thyroid Stimulating Hormone (TSH)  - Electrocardiogram Perform and Read    2. Hypertension  This might be the cause of the symptoms as well.  She was on a beta-blocker before and she exercises a regular basis and I felt that was not important or necessary.  I suggested that we try losartan 50 mg daily in addition to the chlorthalidone.  - Comprehensive Metabolic Panel  - Magnesium  - HM2(CBC w/o Differential)  - Thyroid Stimulating Hormone (TSH)  - Electrocardiogram Perform and Read       Patient Instructions   Due for your physical in March    Check pulse with any symptoms.  Stop by some evening and show me your shira report    Return in about 2 weeks (around 11/14/2017) for hypertension.         Subjective:      Catarina Mendenhall is a 67 y.o. female comes in complaining that she has had some occasions both during exercise and at rest where she is felt some odd sensation in her head.  Like a pressure or a flushing.  To the opposite she says of feeling lightheaded.  It last longer than a B12 or 2.  Yesterday she was walking and wondered whether or not she should go to the house that she was near to sit down or to be able to get help if needed.  She decided to push on and her symptoms resolved.    The following portions of the patient's history were reviewed and updated as appropriate: Past medical history, allergies, medicines,  lab work, nothing unusual, ECG read by me as normal    Review of Systems:    Otherwise completely negative.  She has been eating right without symptoms or problems.  No significant visual symptoms except some floaters.  Eye exam was a year ago no syncopal spells no intrusive presyncopal spells coronary ischemic type symptoms.  She is going to see sleep study sometime soon.  History   Smoking Status     Light Tobacco Smoker   Smokeless Tobacco     Never Used     Comment: 1-2 cigarrettes  a month         Objective:     Vitals:    10/31/17 0759   BP: (!) 136/98   Pulse: 63       Exam:  Blood pressure is elevated patient looks well in no acute distress  CV-RRR S1-S2 normal no irregular beats no bruits  Lungs clear abdomen benign  Extremities without edema  Abdomen benign  Psych-affect normal        Patient Active Problem List   Diagnosis     Urge And Stress Incontinence     Hypertension     Adenomatous colon polyp (03/2014)     Current Outpatient Prescriptions   Medication Sig Dispense Refill     aspirin 81 MG tablet Take 81 mg by mouth daily.       chlorthalidone (HYGROTEN) 25 MG tablet Take 0.5 tablets (12.5 mg total) by mouth daily. 45 tablet 3     losartan (COZAAR) 50 MG tablet Take 1 tablet (50 mg total) by mouth daily. 30 tablet 0     No current facility-administered medications for this visit.          Drew Horan    10/31/2017

## 2021-06-14 NOTE — PROGRESS NOTES
Clinic Note    Assessment:     Assessment and Plan:  1. Hypertension   Still poorly controlled.  Will increase the losartan to 100 mg and chlorthalidone 25 mg and return in 2 weeks.  Check BMP today and again when she returns.  Then if these fail to control appropriately, need to consider calcium channel blocker or beta-blocker as next options.  Adding spironolactone would also be another option.  - Basic Metabolic Panel    With her concerns about wanting to know that she is not can have a heart attack or stroke, I suggested doing a CT heart scan.  I explained what the results would cause us to do and .       Patient Instructions   CT Heart Scan for calcium score, [facility]- #[phone] $100    Increase the losartan to 100 mg daily    Increase the chlorthalidone to 25 mg daily          Return in about 2 weeks (around 12/11/2017) for BP.         Subjective:      Catarina Mendenhall is a 67 y.o. female here for follow-up of her hypertension.  We added losartan to the chlorthalidone and really has not made a huge amount of difference.  She states that she does feel better.  We reviewed and she did have a stress test back in 2014.  She has never had a CT heart scan.  Her lipids have been great.  She exercises by walking on a regular basis and walks vigorously.  She is eating appropriate diet.  No other ischemic related symptoms problems or concerns.    The following portions of the patient's history were reviewed and updated as appropriate: Past medical history, allergies, medicines, lab work last visit with normal renal function potassium, blood pressures over the last couple years    Review of Systems:    As above negative at this point  History   Smoking Status     Light Tobacco Smoker   Smokeless Tobacco     Never Used     Comment: 1-2 cigarrettes  a month         Objective:     Vitals:    11/27/17 1313   BP: (!) 144/96   Pulse: 60       Exam:  Heart sounds are normal with a soft  murmur.  Pulses regular  Repeated blood pressure was 158/88  Extremities without edema  Psych-affect normal    Patient Active Problem List   Diagnosis     Urge And Stress Incontinence     Hypertension     Adenomatous colon polyp (03/2014)     Current Outpatient Prescriptions   Medication Sig Dispense Refill     aspirin 81 MG tablet Take 81 mg by mouth daily.       chlorthalidone (HYGROTEN) 25 MG tablet Take 1 tablet (25 mg total) by mouth daily. 90 tablet 3     losartan (COZAAR) 100 MG tablet Take 1 tablet (100 mg total) by mouth daily. 90 tablet 3     No current facility-administered medications for this visit.          Drew Horan    11/27/2017

## 2021-06-14 NOTE — PROGRESS NOTES
"Dear Dr. Drew Horan MD  [address] [place] [address],    Thank you for the opportunity to participate in the care of Caatrina Mendenhall.     She is a 67 y.o.  female patient who comes to the sleep medicine clinic for review of her sleep study. The study was completed on 11/05/17 which showed the the patient had upper airway resistance syndrome and periodic limb movement sleep.    No past medical history on file.    Past Surgical History:   Procedure Laterality Date     NO PAST SURGERIES         Social History     Social History     Marital status:      Spouse name: N/A     Number of children: N/A     Years of education: N/A     Occupational History     Not on file.     Social History Main Topics     Smoking status: Light Tobacco Smoker     Smokeless tobacco: Never Used      Comment: 1-2 cigarrettes  a month     Alcohol use Not on file     Drug use: No     Sexual activity: Yes     Partners: Male     Other Topics Concern     Not on file     Social History Narrative       Current Outpatient Prescriptions   Medication Sig Dispense Refill     aspirin 81 MG tablet Take 81 mg by mouth daily.       chlorthalidone (HYGROTEN) 25 MG tablet Take 0.5 tablets (12.5 mg total) by mouth daily. 45 tablet 3     losartan (COZAAR) 50 MG tablet TAKE ONE TABLET BY MOUTH ONCE DAILY 90 tablet 0     No current facility-administered medications for this visit.        No Known Allergies    Physical Exam:  /82  Pulse 62  Ht 5' 4\" (1.626 m)  Wt 172 lb 3.2 oz (78.1 kg)  SpO2 99%  BMI 29.56 kg/m2  BMI:Body mass index is 29.56 kg/(m^2).   GEN: NAD, obese  Psych: normal mood, normal affect     Labs/Studies:  - We reviewed the results of the overnight PSG as described on the HPI.     Lab Results   Component Value Date    WBC 5.9 10/31/2017    HGB 13.6 10/31/2017    HCT 40.8 10/31/2017    MCV 93 10/31/2017     10/31/2017         Chemistry        Component Value Date/Time     10/31/2017 0849    K 4.0 " 10/31/2017 0849     10/31/2017 0849    CO2 28 10/31/2017 0849    BUN 19 10/31/2017 0849    CREATININE 0.90 10/31/2017 0849    GLU 92 10/31/2017 0849        Component Value Date/Time    CALCIUM 9.5 10/31/2017 0849    ALKPHOS 92 10/31/2017 0849    AST 19 10/31/2017 0849    ALT 22 10/31/2017 0849    BILITOT 0.5 10/31/2017 0849            No results found for: FERRITIN        Assessment and Plan:  In summary Catarina Mendenhall is a 67 y.o. year old female here for review of her sleep study.  1. UARS  Discussed the different treatment options available including oral appliance, positional therapy and CPAP.  The patient would like to try positional therapy at this point in time.  2.  Hypersomnia  3.  Other sleep disturbance  4.  Periodic limb movement sleep  May resolve after optimal therapy of her upper airway resistance syndrome.     Patient verbalized understanding of these issues, agrees with the plan and all questions were answered today. Patient was given an opportuntity to voice any other symptoms or concerns not listed above. Patient did not have any other symptoms or concerns.     Follow-up as needed.     Tereso Mayo DO  Board Certified in Internal Medicine and Sleep Medicine  [facility].    We spent a total of 15 minutes of face-to-face encounter and more than 50% of the encounter was used for counseling or coordination of care.    (Note created with Dragon voice recognition and unintended spelling errors and word substitutions may occur)

## 2021-06-15 PROBLEM — D12.6 ADENOMATOUS COLON POLYP: Status: ACTIVE | Noted: 2017-03-13

## 2021-06-15 NOTE — PROGRESS NOTES
ASSESSMENT/PLAN:  1. Cough  See #3 below  - Influenza A/B Rapid Test    2. Hypertension  Well-controlled on current meds.  She was not checking her blood sugars correctly at home.  Will continue with this current dose of medications.  Lab work was checked last visit on this dose and everything was fine.  No changes at this time.    3. Influenza A  New diagnosis and will treat her with Tamiflu and montelukast for the cough.  I did write out some prescriptions as they are to be traveling soon driving down to [place].  I gave her written prescription for azithromycin or doxycycline.  She is to call me if she feels she is progressed and needs to use an antibiotic and will let me know so that I can make a decision about which one.    Her  also sees us and needs treatment as he is developed a fever as well.  We will put him on Tamiflu as well.          Patient Instructions   DUE FOR YOUR MAMMOGRAM !!! -- [phone]    Tamiflu one pill twice daily for five days.     If you get a worsening productive cough and fever, you may need an antibiotic. Text Dr. Horan if you plan to fill one of the prescriptions, and he will let you know which one to take.     Montelukast one pill per day to help calm down the airways.     You are still contagious until 24-36 hours after your last fever.         Return if symptoms worsen or fail to improve.    CHIEF COMPLAINT:  Chief Complaint   Patient presents with     Cough     Machine - 115/81       HISTORY OF PRESENT ILLNESS:  Catarina Mendenhall is a 67 y.o. female presenting to the clinic today with complaints of a cough.     Cough: She recently returned from a trip to [place] and now has a cough. Other people on her trip seemed sick. She returned on Friday and started to feel sick on Monday. She thinks her  may have it as well as he had a fever last night. She is not wheezing or short of breath. Her influenza swab done today is positive for type A. She feels slightly better  today than she did yesterday.     Hypertension: She brought her blood pressure cuff into the clinic today to check the accuracy. When used correctly, her blood pressure cuff gave a similar reading to the clinic reading. When she checked it the way she normally does, it was significantly higher. She is taking 25 mg of chlorthalidone and 100 mg of losartan daily.     REVIEW OF SYSTEMS:   She would like to have her ears looked at today. She wears hearing aids. Comprehensive review of systems negative except as noted above.    PFSH:  She just returned from [place]. She is leaving for [place] tomorrow.     TOBACCO USE:  History   Smoking Status     Light Tobacco Smoker   Smokeless Tobacco     Never Used     Comment: 1-2 cigarrettes  a month       VITALS:  Vitals:    01/25/18 1040   BP: 112/78   Pulse: 72   Temp: 100.5  F (38.1  C)     Wt Readings from Last 3 Encounters:   01/05/18 173 lb 8 oz (78.7 kg)   11/27/17 172 lb 3.2 oz (78.1 kg)   09/20/17 170 lb 4.8 oz (77.2 kg)     There is no height or weight on file to calculate BMI.    PHYSICAL EXAM:  General Appearance: Alert, cooperative, no distress, appears stated age.  HEENT: TMs normal  Lungs: Clear to auscultation bilaterally, good air movement.  Psychiatric: she has a normal mood and affect.     Notes Reviewed, additional history from source other than patient (2 TOTAL): None.    Accessed Care Everywhere, Requested Records, Consult with Physician (1 TOTAL): None.     Radiology tests summarized or ordered (XR, CT, MRI, DXA, US): None.    Labs reviewed or ordered (1 TOTAL): Rapid influenza ordered and reviewed - positive for type A    Medicine tests reviewed or ordered (ECG, echocardiogram, colonoscopy, EGD, venous US) (1 TOTAL): None.    Independent review of ECG or XR (2 EACH): None.      The visit lasted a total of 8 minutes face to face with the patient. Over 50% of the time was spent counseling and educating the patient about her influenza.    Johnny GONZALES, hillary  scribing for and in the presence of, Dr. Horan.    I, Dr. horan, personally performed the services described in this documentation, as scribed by Johnny Blair in my presence, and it is both accurate and complete.    MEDICATIONS:  Current Outpatient Prescriptions   Medication Sig Dispense Refill     aspirin 81 MG tablet Take 81 mg by mouth daily.       chlorthalidone (HYGROTEN) 25 MG tablet Take 1 tablet (25 mg total) by mouth daily. 90 tablet 3     losartan (COZAAR) 100 MG tablet Take 1 tablet (100 mg total) by mouth daily. 90 tablet 3     montelukast (SINGULAIR) 10 mg tablet Take 1 tablet (10 mg total) by mouth daily for 14 days. 14 tablet 0     oseltamivir (TAMIFLU) 75 MG capsule Take 1 capsule (75 mg total) by mouth 2 (two) times a day for 5 days. 10 capsule 0     No current facility-administered medications for this visit.        Total data points: 1

## 2021-06-15 NOTE — PROGRESS NOTES
ASSESSMENT/PLAN:  1. Hypertension  Really an good control right now, but have fluctuated at home somewhat.  Overall I think it looks better on these higher doses of chlorthalidone and losartan but we need to check lab work.  With her upcoming travel, I think it is in her best interest not to change medicines at this time or add anything.  Will check a BMP today.  Will follow-up in 3 months.  - Basic Metabolic Panel    2. Elevated glucose  Her sugars been normal before but the last time checked was 130 something.  We will do an A1c today to make certain she is not developing metabolic syndrome or prediabetes.  - Glycosylated Hemoglobin A1c      Patient Instructions   Due for a mammogram - [phone]    Have fun on your trips! Check your blood pressures while you are in [place]. Make sure to watch your sodium intake. Drink plenty of fluids while traveling.     Try experimenting with a strict low sodium diet for a few days and seeing what your blood pressure is.      Use heat for neck stiffness. Also look on YouTube for neck stretching. Use ibuprofen as needed.         Return in about 3 months (around 4/5/2018) for Hypertension .    CHIEF COMPLAINT:  Chief Complaint   Patient presents with     Hypertension       HISTORY OF PRESENT ILLNESS:  Catarina Mendenhall is a 67 y.o. female for a follow-up of hypertension. She is taking her chlorthalidone 25 mg and losartan 100 mg faithfully. She has recorded her blood pressures at home which show systolic readings ranging from 112-174.  Her blood pressure in clinic today was 116/82. She has been watching her sodium intake.     REVIEW OF SYSTEMS:   She has been experiencing neck stiffness.   Comprehensive review of systems negative except as noted above.    PFSH:  She is leaving for [place] on 1/16/2018. When she returns, she will go to [place] for about a month.   Reviewed as above.     TOBACCO USE:  History   Smoking Status     Light Tobacco Smoker   Smokeless Tobacco     Never  Used     Comment: 1-2 cigarrettes  a month       VITALS:  Vitals:    01/05/18 1012 01/05/18 1031   BP: 116/82 122/82   Patient Site:  Right Arm   Patient Position:  Sitting   Cuff Size:  Adult Regular   Pulse: 72    Weight: 173 lb 8 oz (78.7 kg)      Wt Readings from Last 3 Encounters:   01/05/18 173 lb 8 oz (78.7 kg)   11/27/17 172 lb 3.2 oz (78.1 kg)   09/20/17 170 lb 4.8 oz (77.2 kg)     Body mass index is 29.78 kg/(m^2).    PHYSICAL EXAM:  General Appearance: Alert, cooperative, no distress, appears stated age.  Heart: Regular rate and rhythm, S1 and S2 normal, no murmur or bruit.  Extremities-no edema  Psychiatric: She has a normal mood and affect.     Notes Reviewed, additional history from source other than patient (2 TOTAL): None.    Accessed Care Everywhere, Requested Records, Consult with Physician (1 TOTAL): None.     Radiology tests summarized or ordered (XR, CT, MRI, DXA, US): None.    Labs reviewed or ordered (1 TOTAL): Reviewed labs. Labs ordered.     Medicine tests reviewed or ordered (ECG, echocardiogram, colonoscopy, EGD, venous US) (1 TOTAL): None.    Independent review of ECG or XR (2 EACH): None.      The visit lasted a total of 14 minutes face to face with the patient. Over 50% of the time was spent counseling and educating the patient about hypertension.    IJuan, am scribing for and in the presence of, Dr. Horan.    I, Dr. Horan, personally performed the services described in this documentation, as scribed by Juan Koehler in my presence, and it is both accurate and complete.    MEDICATIONS:  Current Outpatient Prescriptions   Medication Sig Dispense Refill     aspirin 81 MG tablet Take 81 mg by mouth daily.       chlorthalidone (HYGROTEN) 25 MG tablet Take 1 tablet (25 mg total) by mouth daily. 90 tablet 3     losartan (COZAAR) 100 MG tablet Take 1 tablet (100 mg total) by mouth daily. 90 tablet 3     No current facility-administered medications for this visit.         Total data points: 1

## 2021-06-17 NOTE — PATIENT INSTRUCTIONS - HE
Patient Instructions by Karlos Talamantes MD at 12/20/2019  7:10 AM     Author: Karlos Talamantes MD Service: -- Author Type: Physician    Filed: 12/20/2019  8:06 AM Encounter Date: 12/20/2019 Status: Addendum    : Karlos Talamantes MD (Physician)    Related Notes: Original Note by Karlos Talamantes MD (Physician) filed at 12/20/2019  8:06 AM       -Rapid strep test is negative.  A confirmatory strep test is in process and will be finalized tomorrow.  -We will only reach out to you if the confirmatory strep test is positive.  An antibiotic will be prescribed if this test is positive.  -No sign of ear infection at this time.  -Recommend rest, hydration, and over the counter pain relievers as needed for fever and discomfort.  Patient Education     Pharyngitis (Sore Throat), Report Pending    Pharyngitis (sore throat) is often due to a virus. It can also be caused by streptococcus (strep), bacteria. This is often called strep throat. Both viral and strep infections can cause throat pain that is worse when swallowing, aching all over, headache, and fever. Both types of infections are contagious. They may be spread by coughing, kissing, or touching others after touching your mouth or nose.  A test has been done to find out if you or your child have strep throat. Call this facility or your healthcare provider if you were not given your test results. If the test is positive for strep infection, you will need to take antibiotic medicines. A prescription can be called into your pharmacy at that time. If the test is negative, you probably have a viral pharyngitis. This does not need to be treated with antibiotics. Until you receive the results of the strep test, you should stay home from work. If your child is being tested, he or she should stay home from school.  Home care    Rest at home. Drink plenty of fluids so you won't get dehydrated.    If the test is positive for strep, you or your child should not go  to work or school for the first 2 days of taking the antibiotics. After this time, you or your child will not be contagious. You or your child can then return to work or school when feeling better.     Use the antibiotic medicine for the full 10 days. Do not stop the medicine even if you or your child feel better. This is very important to make sure the infection is fully treated. It is also important to prevent medicine-resistant germs from growing. If you or your child were given an antibiotic shot, no more antibiotics are needed.    Use throat lozenges or numbing throat sprays to help reduce pain. Gargling with warm salt water will also help reduce throat pain. Dissolve 1/2 teaspoon of salt in 1 glass of warm water. Children can sip on juice or a popsicle. Children 5 years and older can also suck on a lollipop or hard candy.    Don't eat salty or spicy foods or give them to your child. These can irritate the throat.  Other medicine for a child: You can give your child acetaminophen for fever, fussiness, or discomfort. In babies over 6 months of age, you may use ibuprofen instead of acetaminophen. If your child has chronic liver or kidney disease or ever had a stomach ulcer or GI bleeding, talk with your maricel healthcare provider before giving these medicines. Aspirin should never be used by any child under 18 years of age who has a fever. It may cause severe liver damage.  Other medicine for an adult: You may use acetaminophen or ibuprofen to control pain or fever, unless another medicine was prescribed for this. If you have chronic liver or kidney disease or ever had a stomach ulcer or GI bleeding, talk with your healthcare provider before using these medicines.  Follow-up care  Follow up with your healthcare provider or our staff if you or your child don't get better over the next week.  When to seek medical advice  Call your healthcare provider right away if any of these occur:    Fever as directed by your  healthcare provider. For children, seek care if:  ? Your child is of any age and has repeated fevers above 104 F (40 C).  ? Your child is younger than 2 years of age and has a fever of 100.4 F (38 C) for more than 1 day.  ? Your child is 2 years old or older and has a fever of 100.4 F (38 C) for more than 3 days.    New or worsening ear pain, sinus pain, or headache    Painful lumps in the back of neck    Stiff neck    Lymph nodes are getting larger    ?Cant swallow liquids, a lot of drooling, or cant open mouth wide due to throat pain    Signs of dehydration, such as very dark urine or no urine, sunken eyes, dizziness    Trouble breathing or noisy breathing    Muffled voice    New rash    Other symptoms getting worse  Prevention  Here are steps you can take to help prevent an infection:    Keep good hand washing habits.    Dont have close contact with people who have sore throats, colds, or other upper respiratory infections.    Dont smoke, and stay away from secondhand smoke.    Stay up to date with of your vaccines.  Date Last Reviewed: 11/1/2017 2000-2017 [employer]. [address] [place] [address], [place] [address]. All rights reserved. This information is not intended as a substitute for professional medical care. Always follow your healthcare professional's instructions.

## 2021-06-18 NOTE — PATIENT INSTRUCTIONS - HE
Continue to monitor   Patient Instructions by Sally Garrett FNP at 11/19/2020  2:00 PM     Author: Sally Garrett FNP Service: -- Author Type: Nurse Practitioner    Filed: 11/19/2020  2:17 PM Encounter Date: 11/19/2020 Status: Signed    : Sally Garrett FNP (Nurse Practitioner)         Patient Education   Understanding USDA MyPlate  The USDA (US Department of Agriculture) has guidelines to help you make healthy food choices. These are called MyPlate. MyPlate shows the food groups that make up healthy meals using the image of a place setting. Before you eat, think about the healthiest choices for what to put onto your plate or into your cup or bowl. To learn more about building a healthy plate, visit www.Predictive Biosciencesplate.gov.       The Food Groups    Fruits: Any fruit or 100% fruit juice counts as part of the Fruit Group. Fruits may be fresh, canned, frozen, or dried, and may be whole, cut-up, or pureed. Make half your plate fruits and vegetables.    Vegetables: Any vegetable or 100% vegetable juice counts as a member of the Vegetable Group. Vegetables may be fresh, frozen, canned, or dried. They can be served raw or cooked and may be whole, cut-up, or mashed. Make half your plate fruits and vegetables.     Grains: All foods made from grains are part of the Grains Group. These include wheat, rice, oats, cornmeal, and barley such as bread, pasta, oatmeal, cereal, tortillas, and grits. Grains should be no more than a quarter of your plate. At least half of your grains should be whole grains.    Protein: This group includes meat, poultry, seafood, beans and peas, eggs, processed soy products (like tofu), nuts (including nut butters), and seeds. Make protein choices no more than a quarter of your plate. Meat and poultry choices should be lean or low fat.    Dairy: All fluid milk products and foods made from milk that contain calcium, like yogurt and cheese are part of the Dairy Group. (Foods that have little calcium, such  as cream, butter, and cream cheese, are not part of the group.) Most dairy choices should be low-fat or fat-free.    Oils: These are fats that are liquid at room temperature. They include canola, corn, olive, soybean, and sunflower oil. Foods that are mainly oil include mayonnaise, certain salad dressings, and soft margarines. You should have only 5 to 7 teaspoons of oils a day. You probably already get this much from the food you eat.  Use [employer] to Help Build Your Meals  The [employer] network [employer]cker can help you plan and track your meals and activity. You can look up individual foods to see or compare their nutritional value. You can get guidelines for what and how much you should eat. You can compare your food choices. And you can assess personal physical activities and see ways you can improve. Go to www.Arachnys.MyMusic/Twylahcker/.    3883-5372 The [employer]. [address] [place] [address]. All rights reserved. This information is not intended as a substitute for professional medical care. Always follow your healthcare professional's instructions.           Patient Education   Urinary Incontinence, Female (Adult)  Urinary incontinence means loss of control of the bladder. This problem affects many women, especially as they get older. If you have incontinence, you may be embarrassed to ask for help. But know that this problem can be treated.  Types of Incontinence  There are different types of incontinence. Two of the main types are described here. You can have more than one type.    Stress incontinence. With this type, urine leaks when pressure (stress) is put on the bladder. This may happen when you cough, sneeze, or laugh. Stress incontinence most often occurs because the pelvic floor muscles that support the bladder and urethra are weak. This can happen after pregnancy and vaginal childbirth or a hysterectomy. It can also be due to excess body weight or hormone changes.    Urge  incontinence (also called overactive bladder). With this type, a sudden urge to urinate is felt often. This may happen even though there may not be much urine in the bladder. The need to urinate often during the night is common. Urge incontinence most often occurs because of bladder spasms. This may be due to bladder irritation or infection. Damage to bladder nerves or pelvic muscles, constipation, and certain medicines can also lead to urge incontinence.  Treatment of urinary incontinence depends on the cause. Further evaluation is needed to find the type you have. This will likely include an exam and certain tests. Based on the results, you and your healthcare provider can then plan treatment. Until a diagnosis is made, the home care tips below can help relieve symptoms.  Home care    Do pelvic floor muscle exercises, if they are prescribed. The pelvic floor muscles help support the bladder and urethra. Many women find that their symptoms improve when doing special exercises that strengthen these muscles. To do the exercises contract the muscles you would use to stop your stream of urine, but do this when youre not urinating. Hold for 10 seconds, then relax. Repeat 10 to 20 times in a row, at least 3 times a day. Your provider may give you other instructions for how to do the exercises and how often.    Keep a bladder diary. This helps track how often and how much you urinate over a set period of time. Bring this diary with you to your next visit with the provider. The information can help your provider learn more about your bladder problem.    Lose weight, if advised to by your provider. Excess weight puts pressure on the bladder. Your provider can help you create a weight-loss plan thats right for you. This may include exercising more and making certain diet changes.    Don't consume foods and drinks that may irritate the bladder. These can include alcohol and caffeinated drinks.    Quit smoking. Smoking and  other tobacco use can lead to chronic cough that strains the pelvic floor muscles. Smoking may also damage the bladder and urethra. Talk with your provider about treatments or methods you can use to quit smoking.    If drinking large amounts of fluid causes you to have symptoms, you may be advised to limit your fluid intake. You may also be advised to drink most of your fluids during the day and to limit fluids at night.    If youre worried about urine leakage or accidents, you may wear absorbent pads to catch urine. Change the pads often. This helps reduce discomfort. It may also reduce the risk of skin or bladder infections.  Follow-up care  Follow up with your healthcare provider, or as directed. It may take some to find the right treatment for your problem. Your treatment plan may include special therapies or medicines. Certain procedures or surgery may also be options. Be sure to discuss any questions you have with your provider.  When to seek medical advice  Call the healthcare provider right away if any of these occur:    Fever of 100.4 F (38 C) or higher, or as directed by your provider    Bladder pain or fullness    Abdominal swelling    Nausea or vomiting    Back pain    Weakness, dizziness or fainting  Date Last Reviewed: 10/1/2017    0708-3012 [employer]. [address] [place], [place] [address]. All rights reserved. This information is not intended as a substitute for professional medical care. Always follow your healthcare professional's instructions.     Patient Education   Understanding Advance Care Planning  Advance care planning is the process of deciding ones own future medical care. It helps ensure that if you cant speak for yourself, your wishes can still be carried out. The plan is a series of legal documents that note a persons wishes. The documents vary by state. Advance care planning may be done when a person has a serious illness that is expected to get worse. It may be done  before major surgery. And it can help you and your family be prepared in case of a major illness or injury. Advance care planning helps with making decisions at these times.       A health care proxy is a person who acts as the voice of a patient when the patient cant speak for himself or herself. The name of this role varies by state. It may be called a Durable Medical Power of  or Durable Power of  for Healthcare. It may be called an agent, surrogate, or advocate. Or it may be called a representative or decision maker. It is an official duty that is identified by a legal document. The document also varies by state.    Why Is Advance Care Planning Important?  If a person communicates their healthcare wishes:    They will be given medical care that matches their values and goals.    Their family members will not be forced to make decisions in a crisis with no guidance.  Creating a Plan  Making an advance care plan is often done in 3 steps:    Thinking about ones wishes. To create an advance care plan, you should think about what kind of medical treatment you would want if you lose the ability to communicate. Are there any situations in which you would refuse or stop treatment? Are there therapies you would want or not want? And whom do you want to make decisions for you? There are many places to learn more about how to plan for your care. Ask your doctor or  for resources.    Picking a health care proxy. This means choosing a trusted person to speak for you only when you cant speak for yourself. When you cannot make medical decisions, your proxy makes sure the instructions in your advance care plan are followed. A proxy does not make decisions based on his or her own opinions. They must put aside those opinions and values if needed, and carry out your wishes.    Filling out the legal documents. There are several kinds of legal documents for advance care planning. Each one tells health  care providers your wishes. The documents may vary by state. They must be signed and may need to be witnessed or notarized. You can cancel or change them whenever you wish. Depending on your state, the documents may include a Healthcare Proxy form, Living Will, Durable Medical Power of , Advance Directive, or others.  The Familys Role  The best help a family can give is to support their loved ones wishes. Open and honest communication is vital. Family should express any concerns they have about the patients choices while the patient can still make decisions.    2528-3425 [employer]. [address] [place] [address]. All rights reserved. This information is not intended as a substitute for professional medical care. Always follow your healthcare professional's instructions.         Also, [facility] offers a free, downloadable health care directive that allows you to share your treatment choices and personal preferences if you cannot communicate your wishes. It also allows you to appoint another person (called a health care agent) to make health care decisions if you are unable to do so. You can download an advance directive by going here: [email or web address]     Patient Education   Personalized Prevention Plan  You are due for the preventive services outlined below.  Your care team is available to assist you in scheduling these services.  If you have already completed any of these items, please share that information with your care team to update in your medical record.  Health Maintenance   Topic Date Due   ? HEPATITIS C SCREENING  1950   ? ZOSTER VACCINES (1 of 2) 09/05/2001   ? DXA SCAN  12/07/2016   ? TD 18+ HE  06/16/2019   ? Pneumococcal Vaccine: 65+ Years (2 of 2 - PPSV23) 12/17/2019   ? ADVANCE CARE PLANNING  07/22/2020   ? MAMMOGRAM  03/25/2021   ? MEDICARE ANNUAL WELLNESS VISIT  11/19/2021   ? FALL RISK ASSESSMENT  11/19/2021    ? LIPID  10/22/2024   ? COLORECTAL CANCER SCREENING  10/30/2029   ? INFLUENZA VACCINE RULE BASED  Completed   ? Pneumococcal Vaccine: Pediatrics (0 to 5 Years) and At-Risk Patients (6 to 64 Years)  Aged Out   ? HEPATITIS B VACCINES  Aged Out

## 2021-06-19 NOTE — LETTER
Letter by Sally Garrett FNP at      Author: Sally Garrett FNP Service: -- Author Type: --    Filed:  Encounter Date: 11/11/2019 Status: Signed         Catarina Mendenhall  [address] [place] [address]             November 11, 2019         Dear Ms. Mendenhall,    Below are the results from your recent visit:    Resulted Orders   Holter Monitor    Narrative    HOLTER MONITOR    INTERPRETATION DATE:  11/07/2019    TEST DATE:  11/05/2019    INTERPRETATION:  Predominant rhythm is sinus rhythm with rates ranging   from 57 beats  per minute to 110 beats per minute.  There was no significant bradycardia   or pauses.   Only 9 atrial premature beats were noted over 24 hours.  There was no   ventricular  ectopy present.  There were also no significant pauses.  Patient reported   symptoms  of fluttering associated with normal sinus rhythm, rate 88 beats per   minute without  apparent ectopy.    CONCLUSION:  Normal Holter.      ANG MARTINEZ MD  tn  D 11/07/2019 14:16:35  T 11/07/2019 14:34:48  R 11/07/2019 14:34:48  [id number]        Normal holter result. Continue with your blood pressure medications and follow up as discussed.     Please call with questions or contact us using [employer].    Sincerely,        Electronically signed by NAV Spivey

## 2021-06-19 NOTE — LETTER
Letter by Sally Garrett FNP at      Author: Sally Garrett FNP Service: -- Author Type: --    Filed:  Encounter Date: 4/4/2019 Status: (Other)         Catarina Mendenhall  [address]  [place] [address]             April 4, 2019         Dear Ms. Mendenhall,    Below are the results from your recent visit:    Resulted Orders   US Venous Leg Right    Narrative    EXAM: US VENOUS LEG RIGHT  LOCATION: [facility]  DATE/TIME: 4/3/2019 1:40 PM    INDICATION: Pain in right lower leg  COMPARISON: None.  TECHNIQUE: Routine exam without and with compression, augmentation, and duplex utilizing 2D gray-scale imaging, Doppler interrogation with color-flow and spectral waveform analysis.    FINDINGS: The common femoral, femoral, popliteal, and segmentally visualized calf veins were evaluated. The opposite CFV was also included in the evaluation.    Right leg veins are negative for deep venous thrombosis. No popliteal cysts.      Impression    CONCLUSION:  1.  Right leg veins are negative for DVT.        Negative US. No DVT.     Please call with questions or contact us using [facility].    Sincerely,        Electronically signed by NAV Spivey

## 2021-06-20 NOTE — LETTER
Letter by Sally Garrett FNP at      Author: Sally Garrett FNP Service: -- Author Type: --    Filed:  Encounter Date: 1/6/2020 Status: Signed         Catarina Mendenhall  [address]  [place] [address]             January 6, 2020         Dear Ms. Mendenhall,    Below are the results from your recent visit:    Resulted Orders   Hemoglobin   Result Value Ref Range    Hemoglobin 12.5 12.0 - 16.0 g/dL   Basic Metabolic Panel   Result Value Ref Range    Sodium 139 136 - 145 mmol/L    Potassium 3.4 (L) 3.5 - 5.0 mmol/L    Chloride 102 98 - 107 mmol/L    CO2 27 22 - 31 mmol/L    Anion Gap, Calculation 10 5 - 18 mmol/L    Glucose 91 70 - 125 mg/dL    Calcium 9.5 8.5 - 10.5 mg/dL    BUN 20 8 - 22 mg/dL    Creatinine 1.13 (H) 0.60 - 1.10 mg/dL    GFR MDRD Af Amer 58 (L) >60 mL/min/1.73m2    GFR MDRD Non Af Amer 48 (L) >60 mL/min/1.73m2    Narrative    Fasting Glucose reference range is 70-99 mg/dL per  American Diabetes Association (ADA) guidelines.   Urinalysis-UC if Indicated   Result Value Ref Range    Color, UA Yellow Colorless, Yellow, Straw, Light Yellow    Clarity, UA Clear Clear    Glucose, UA Negative Negative    Bilirubin, UA Negative Negative    Ketones, UA Negative Negative    Specific Gravity, UA 1.020 1.005 - 1.030    Blood, UA Trace (!) Negative    pH, UA 5.5 5.0 - 8.0    Protein, UA Negative Negative mg/dL    Urobilinogen, UA 0.2 E.U./dL 0.2 E.U./dL, 1.0 E.U./dL    Nitrite, UA Negative Negative    Leukocytes, UA Negative Negative    Bacteria, UA Few (!) None Seen hpf    RBC, UA 0-2 None Seen, 0-2 hpf    WBC, UA None Seen None Seen, 0-5 hpf    Squam Epithel, UA None Seen None Seen, 0-5 lpf    Narrative    UC not indicated   INR   Result Value Ref Range    INR 0.95 0.90 - 1.10    Narrative    INR Therapeutic Ranges:  Mech. Valve 2.5-3.5  Post Surg.  2.0-3.0  DVT/PE      2.0-3.0      Abnormal lab result, showing reduced renal function. This could be due to newly introduced  hydrochlorothiazide. Plan  to reduce hydrochlorothiazide to 12.5 mg daily and consume at least one  banana daily. Plan to follow up for a recheck after surgery.    Please call with questions or contact us using MyChart.    Sincerely,        Electronically signed by NAV Spivey

## 2021-06-22 NOTE — PROGRESS NOTES
Office Visit - New Patient   Catarina Mendenhall   68 y.o.  female    Date of visit: 12/17/2018  Physician: Sally Garrett CNP     Assessment and Plan   1. Establishing care with new doctor, encounter for  2. Urge And Stress Incontinence  Well managed bu patient, no concern at this time    3. Hypertension  Managed with both chlorthalidone and losartan  - Lipid Cascade; Future  - Thyroid Stimulating Hormone (TSH); Future      The following high BMI interventions were performed this visit: encouragement to exercise and dietary management education, guidance, and counseling    No Follow-up on file.     Chief Complaint   Chief Complaint   Patient presents with     Medication check     also flu shot        Patient Profile   Social History     Social History Narrative     Not on file        Past Medical History   Patient Active Problem List   Diagnosis     Urge And Stress Incontinence     Hypertension     Adenomatous colon polyp (03/2014)       Past Surgical History  She has a past surgical history that includes No past surgeries.     History of Present Illness   This 68 y.o. old female patient who presents to the clinic to establish care and to have her medications refilled. She reports that she is doing well. She exercise regularly and takes her medication as prescribed. She will like to get her vaccines but not the shingles. She reports that she sees dermatology and gynecologist yearly. She denied chest pain, shortness of breath, fever and chills.      Review of Systems: A comprehensive review of systems was negative except as noted.     Medications and Allergies   Current Outpatient Medications   Medication Sig Dispense Refill     aspirin 81 MG tablet Take 81 mg by mouth daily.       chlorthalidone (HYGROTEN) 25 MG tablet Take 1 tablet (25 mg total) by mouth daily. 90 tablet 3     losartan (COZAAR) 100 MG tablet Take 1 tablet (100 mg total) by mouth daily. 90 tablet 3     No current facility-administered  medications for this visit.      No Known Allergies     Family and Social History   Family History   Problem Relation Age of Onset     Cancer Paternal Grandmother         Unsure what kind of cancer.  Age in 40's     Hypertension Mother      Heart attack Father      Clotting disorder Father         Social History     Tobacco Use     Smoking status: Former Smoker     Last attempt to quit: 12/17/2015     Years since quitting: 3.0     Smokeless tobacco: Never Used     Tobacco comment: 1-2 cigarrettes  a month   Substance Use Topics     Alcohol use: Not on file     Drug use: No        Physical Exam   General Appearance:   Well groomed    /90 (Patient Site: Right Arm, Patient Position: Sitting, Cuff Size: Adult Regular)   Pulse 67   Wt 169 lb 4.8 oz (76.8 kg)   LMP 03/09/2007   SpO2 97%   BMI 29.06 kg/m      EYES: Eyelids, conjunctiva, and sclera were normal. Pupils were normal. Cornea, iris, and lens were normal bilaterally.  HEAD, EARS, NOSE, MOUTH, AND THROAT: Head and face were normal. Hearing was normal to voice and the ears were normal to external exam. Nose appearance was normal and there was no discharge. Oropharynx was normal.  NECK: Neck appearance was normal. There were no neck masses and the thyroid was not enlarged.  RESPIRATORY: Breathing pattern was normal and the chest moved symmetrically.  Percussion/auscultatory percussion was normal.    CARDIOVASCULAR: Heart rate and rhythm were normal.  S1 and S2 were normal and there were no extra sounds or murmurs.   MUSCULOSKELETAL: Skeletal configuration was normal and muscle mass was normal for age. Joint appearance was overall normal.  LYMPHATIC: There were no enlarged nodes.  SKIN/HAIR/NAILS: Skin color was normal.  There were no skin lesions.  Hair and nails were normal.  NEUROLOGIC: The patient was alert and oriented to person, place, time, and circumstance. Speech was normal.   PSYCHIATRIC:  Mood and affect were normal and the patient had normal  recent and remote memory. The patient's judgment and insight were normal.       Additional Information     Reviewed her previous lab results and confirmed normal labs including her TSH    Time: total time spent with the patient was 30 minutes of which >50% was spent in counseling and coordination of care     Sally Garrett CNP  Family Nurse Practitioner  [facility]  [phone]  [email or web address]

## 2021-06-23 NOTE — TELEPHONE ENCOUNTER
Referral Request  Type of referral: Audiology   Who s requesting: Patient  Why the request: hearing tests   Have you been seen for this request: No:  Appointment Offered:  declined  Does patient have a preference on a group/provider?[facility] Esperanza Padron fax:[phone]  Okay to leave a detailed message?  Yes

## 2021-06-24 ENCOUNTER — COMMUNICATION - HEALTHEAST (OUTPATIENT)
Dept: SCHEDULING | Facility: CLINIC | Age: 71
End: 2021-06-24

## 2021-06-24 NOTE — TELEPHONE ENCOUNTER
FYI - Status Update  Who is Calling: Patient  Update: Patient is questioning the status of the below request. Patient is scheduled to see the Audiologist on 03/01/2019. Please advise.  Okay to leave a detailed message?:  Yes

## 2021-06-26 ENCOUNTER — HEALTH MAINTENANCE LETTER (OUTPATIENT)
Age: 71
End: 2021-06-26

## 2021-06-26 NOTE — TELEPHONE ENCOUNTER
Triage Call:    -Patient calling stating she thinks she had a possible tick attached to the back of her R. Leg (behind the knee) that she noticed this morning.   -Patient pulled it off completely, no head attached. Patient is not even sure it is was a tick or not.   -Patient denies any sx listed below in triage assessment.   -Protocol leads to home care at this time. Home care education given to patient. RN advised if patient develops any new or worsening sx to call back. Patient verbalized understanding and agrees with plan.     Anette Randle RN, BSN Nurse Triage Advisor 10:10 AM 6/24/2021     Reason for Disposition    Tick bite with no complications    Additional Information    Negative: Patient sounds very sick or weak to the triager    Negative: Not a tick bite    Negative: Fever or severe headache occurs, 2 to 14 days following the bite    Negative: Widespread rash occurs, 2 to 14 days following the bite    Negative: Can't remove live tick (after using Care Advice)    Negative: Can't remove tick's head that was broken off in the skin (after using Care Advice)    Negative: Fever and area is red    Negative: Fever and area is very tender to touch    Negative: Red streak or red line and length > 2 inches (5 cm)    Negative: Red ring or bull's-eye rash occurs around a deer tick bite    Negative: Probable deer tick that was attached > 36 hours (or tick appears swollen, not flat)    Negative: Patient wants to be seen    Protocols used: TICK BITE-A-OH    COVID 19 Nurse Triage Plan/Patient Instructions    Please be aware that novel coronavirus (COVID-19) may be circulating in the community. If you develop symptoms such as fever, cough, or SOB or if you have concerns about the presence of another infection including coronavirus (COVID-19), please contact your health care provider or visit  [email or web address].    Disposition/Instructions    Home care recommended. Follow home care protocol based  instructions.    Thank you for taking steps to prevent the spread of this virus.  o Limit your contact with others.  o Wear a simple mask to cover your cough.  o Wash your hands well and often.    Resources    [facility]: About COVID-19: [email or web address]    CDC: What to Do If You're Sick: www.cdc.gov/coronavirus/2019-ncov/about/steps-when-sick.html    CDC: Ending Home Isolation: www.cdc.gov/coronavirus/2019-ncov/hcp/disposition-in-home-patients.html     CDC: Caring for Someone: www.cdc.gov/coronavirus/2019-ncov/if-you-are-sick/care-for-someone.html     [facility]: Interim Guidance for Hospital Discharge to Home: www.[email or web address]/[email or web address]/hcp/hospdischarge.pdf    [facility] clinical trials (COVID-19 research studies): [email or web address]-clinical-trials     Below are the COVID-19 hotlines at the [place] Department of [facility] ([facility]). Interpreters are available.   o For health questions: Call 321-805-2001 or 1-600.669.9228 (7 a.m. to 7 p.m.)  o For questions about schools and childcare: Call 257-187-5055 or 1-462.544.7977 (7 a.m. to 7 p.m.)

## 2021-07-03 NOTE — ADDENDUM NOTE
Addendum Note by Eliza Braga at 11/19/2020  2:00 PM     Author: Eliza Braga Service: -- Author Type:     Filed: 11/19/2020  4:06 PM Encounter Date: 11/19/2020 Status: Signed    : Eliza Braga ()    Addended by: ELIZA BRAGA on: 11/19/2020 04:06 PM        Modules accepted: Orders

## 2021-07-03 NOTE — ADDENDUM NOTE
Addendum Note by Sally Gómez FNP at 4/17/2019  2:45 PM     Author: Sally Gómez FNP Service: -- Author Type: Nurse Practitioner    Filed: 4/17/2019  2:45 PM Encounter Date: 4/9/2019 Status: Signed    : Sally Gómez FNP (Nurse Practitioner)    Addended by: SALLY GÓMEZ on: 4/17/2019 02:45 PM        Modules accepted: Orders

## 2021-07-13 ENCOUNTER — RECORDS - HEALTHEAST (OUTPATIENT)
Dept: ADMINISTRATIVE | Facility: CLINIC | Age: 71
End: 2021-07-13

## 2021-07-21 ENCOUNTER — RECORDS - HEALTHEAST (OUTPATIENT)
Dept: ADMINISTRATIVE | Facility: CLINIC | Age: 71
End: 2021-07-21

## 2021-08-04 ENCOUNTER — HOSPITAL ENCOUNTER (OUTPATIENT)
Dept: CT IMAGING | Facility: CLINIC | Age: 71
Discharge: HOME | End: 2021-08-04
Attending: PHYSICIAN ASSISTANT | Admitting: PHYSICIAN ASSISTANT
Payer: COMMERCIAL

## 2021-08-04 ENCOUNTER — OFFICE VISIT (OUTPATIENT)
Dept: FAMILY MEDICINE | Facility: CLINIC | Age: 71
End: 2021-08-04
Payer: COMMERCIAL

## 2021-08-04 ENCOUNTER — NURSE TRIAGE (OUTPATIENT)
Dept: NURSING | Facility: CLINIC | Age: 71
End: 2021-08-04

## 2021-08-04 VITALS
RESPIRATION RATE: 21 BRPM | TEMPERATURE: 98.7 F | HEART RATE: 90 BPM | DIASTOLIC BLOOD PRESSURE: 92 MMHG | OXYGEN SATURATION: 98 % | WEIGHT: 165 LBS | SYSTOLIC BLOOD PRESSURE: 138 MMHG | BODY MASS INDEX: 27.46 KG/M2

## 2021-08-04 DIAGNOSIS — R51.9 NEW ONSET HEADACHE: ICD-10-CM

## 2021-08-04 DIAGNOSIS — R51.9 NEW ONSET HEADACHE: Primary | ICD-10-CM

## 2021-08-04 LAB
ANION GAP SERPL CALCULATED.3IONS-SCNC: 12 MMOL/L (ref 5–18)
BASOPHILS # BLD AUTO: 0 10E3/UL (ref 0–0.2)
BASOPHILS NFR BLD AUTO: 1 %
BUN SERPL-MCNC: 14 MG/DL (ref 8–22)
CALCIUM SERPL-MCNC: 9.8 MG/DL (ref 8.5–10.5)
CHLORIDE BLD-SCNC: 103 MMOL/L (ref 98–107)
CO2 SERPL-SCNC: 25 MMOL/L (ref 22–31)
CREAT SERPL-MCNC: 0.98 MG/DL (ref 0.6–1.1)
EOSINOPHIL # BLD AUTO: 0.1 10E3/UL (ref 0–0.7)
EOSINOPHIL NFR BLD AUTO: 1 %
ERYTHROCYTE [DISTWIDTH] IN BLOOD BY AUTOMATED COUNT: 13.4 % (ref 10–15)
GFR SERPL CREATININE-BSD FRML MDRD: 59 ML/MIN/1.73M2
GLUCOSE BLD-MCNC: 122 MG/DL (ref 70–125)
HCT VFR BLD AUTO: 41.5 % (ref 35–47)
HGB BLD-MCNC: 13.8 G/DL (ref 11.7–15.7)
IMM GRANULOCYTES # BLD: 0 10E3/UL
IMM GRANULOCYTES NFR BLD: 0 %
LYMPHOCYTES # BLD AUTO: 1.1 10E3/UL (ref 0.8–5.3)
LYMPHOCYTES NFR BLD AUTO: 13 %
MCH RBC QN AUTO: 30.5 PG (ref 26.5–33)
MCHC RBC AUTO-ENTMCNC: 33.3 G/DL (ref 31.5–36.5)
MCV RBC AUTO: 92 FL (ref 78–100)
MONOCYTES # BLD AUTO: 0.5 10E3/UL (ref 0–1.3)
MONOCYTES NFR BLD AUTO: 6 %
NEUTROPHILS # BLD AUTO: 6.7 10E3/UL (ref 1.6–8.3)
NEUTROPHILS NFR BLD AUTO: 79 %
PLATELET # BLD AUTO: 309 10E3/UL (ref 150–450)
POTASSIUM BLD-SCNC: 3.7 MMOL/L (ref 3.5–5)
RBC # BLD AUTO: 4.53 10E6/UL (ref 3.8–5.2)
SODIUM SERPL-SCNC: 140 MMOL/L (ref 136–145)
WBC # BLD AUTO: 8.4 10E3/UL (ref 4–11)

## 2021-08-04 PROCEDURE — 80048 BASIC METABOLIC PNL TOTAL CA: CPT | Performed by: PHYSICIAN ASSISTANT

## 2021-08-04 PROCEDURE — 70450 CT HEAD/BRAIN W/O DYE: CPT

## 2021-08-04 PROCEDURE — 85025 COMPLETE CBC W/AUTO DIFF WBC: CPT | Performed by: PHYSICIAN ASSISTANT

## 2021-08-04 PROCEDURE — 36415 COLL VENOUS BLD VENIPUNCTURE: CPT | Performed by: PHYSICIAN ASSISTANT

## 2021-08-04 PROCEDURE — 99213 OFFICE O/P EST LOW 20 MIN: CPT | Performed by: PHYSICIAN ASSISTANT

## 2021-08-04 ASSESSMENT — ENCOUNTER SYMPTOMS
LIGHT-HEADEDNESS: 0
COUGH: 0
CHILLS: 0
WEAKNESS: 0
SEIZURES: 0
DIAPHORESIS: 0
FACIAL ASYMMETRY: 0
CHEST TIGHTNESS: 0
PALPITATIONS: 0
SPEECH DIFFICULTY: 0
HEADACHES: 1
PARESTHESIAS: 0
NUMBNESS: 0
TREMORS: 0
SHORTNESS OF BREATH: 0
EYE PAIN: 0
FATIGUE: 0
DIZZINESS: 0

## 2021-08-04 NOTE — PROGRESS NOTES
Assessment & Plan     New onset headache, tension type  Dizziness    - CBC with platelets and differential  - Basic metabolic panel  (Ca, Cl, CO2, Creat, Gluc, K, Na, BUN)  - CT Head w/o Contrast  - CBC with platelets and differential  - Basic metabolic panel  (Ca, Cl, CO2, Creat, Gluc, K, Na, BUN)     Alternate Tylenol and Ibuprofen as needed. Increase fluids with moreno and hydrating fluids with electrolytes. Monitor for new or worsening symptoms such as vomiting, vision or hearing changes, fainting, dizziness. If so, go immediately to ED. If problem persists, I do recommend follow up with PCP for MRI.       Return in about 1 week (around 8/11/2021), or if symptoms worsen or fail to improve.      Subjective     Catarina is a 70 year old female who presents to clinic today for the following health issues:  Chief Complaint   Patient presents with     Headache     pulsing pain in the top of head for the last 24hrs       Catarina presents with new onset headache that began yesterday around 0800 while driving. She report she has pain 5/10, throbbing/pounding with no radiation in her right upper head (parietal) region. She states she does not have vision or hearing changes, nausea, vomiting, history of trauma, facial drooping, change in speech, irritability, loss of consciousness, FH of brain issues. She report she is in a study for adult aging and gets MRIs yearly, last one about 1 year ago. She has tried Ibuprofen which offers little to no improvement of symptoms.     Headache   Pertinent negatives include no palpitations and no shortness of breath.       Review of Systems   Constitutional: Negative for chills, diaphoresis and fatigue.   HENT: Negative for hearing loss.    Eyes: Negative for pain and visual disturbance.   Respiratory: Negative for cough, chest tightness and shortness of breath.    Cardiovascular: Negative for chest pain and palpitations.   Neurological: Positive for headaches. Negative for dizziness,  tremors, seizures, syncope, facial asymmetry, speech difficulty, weakness, light-headedness, numbness and paresthesias.           Objective    BP (!) 145/90   Pulse 90   Temp 98.7  F (37.1  C)   Resp 21   Wt 74.8 kg (165 lb)   SpO2 98%   BMI 27.46 kg/m    Physical Exam  Constitutional:       General: She is not in acute distress.     Appearance: Normal appearance. She is normal weight. She is not ill-appearing, toxic-appearing or diaphoretic.   HENT:      Head: Normocephalic and atraumatic.      Right Ear: Tympanic membrane normal.      Left Ear: Tympanic membrane normal.      Nose: Nose normal.      Mouth/Throat:      Mouth: Mucous membranes are moist.      Pharynx: Oropharynx is clear.   Eyes:      Extraocular Movements: Extraocular movements intact.      Conjunctiva/sclera: Conjunctivae normal.      Pupils: Pupils are equal, round, and reactive to light.   Cardiovascular:      Rate and Rhythm: Normal rate and regular rhythm.      Heart sounds: Normal heart sounds.   Pulmonary:      Effort: Pulmonary effort is normal.      Breath sounds: Normal breath sounds.   Musculoskeletal:         General: Normal range of motion.      Cervical back: Normal range of motion and neck supple. No tenderness.   Lymphadenopathy:      Cervical: No cervical adenopathy.   Skin:     General: Skin is warm and dry.      Capillary Refill: Capillary refill takes more than 3 seconds.   Neurological:      General: No focal deficit present.      Mental Status: She is alert and oriented to person, place, and time.      Cranial Nerves: Cranial nerves are intact.      Sensory: Sensation is intact.      Motor: Motor function is intact.      Coordination: Coordination is intact.      Gait: Gait is intact.   Psychiatric:         Mood and Affect: Mood normal.         Behavior: Behavior normal.         Thought Content: Thought content normal.         Judgment: Judgment normal.              Rocco Faith PA-C

## 2021-08-04 NOTE — PATIENT INSTRUCTIONS
Alternate Tylenol and Ibuprofen as needed. Increase fluids with moreno and hydrating fluids with electrolytes. Monitor for new or worsening symptoms such as vomiting, vision or hearing changes, fainting, dizziness. If so, go immediately to ED. If problem persists, I do recommend follow up with PCP for MRI.

## 2021-08-04 NOTE — TELEPHONE ENCOUNTER
Patient reporting yesterday she noticed a   Pulsing pain. Pain is  #4 on pain scale.  Top of head ,  Feels like a nerve pulsing nerve.  Never has had this before   No other symptoms.    Patient asking to be seen today.  Call sent to PSR for scheduling patient.    Melvina Alas RN RN  [facility] Triage/refill nurse    Reason for Disposition    Patient wants to be seen    New headache and age > 50    Protocols used: HEADACHE-A-OH

## 2021-08-09 ENCOUNTER — TELEPHONE (OUTPATIENT)
Dept: URGENT CARE | Facility: URGENT CARE | Age: 71
End: 2021-08-09

## 2021-08-09 NOTE — TELEPHONE ENCOUNTER
Performed Peer to Peer to get approval for head ct 93146. Approved [id number] based on patient being >50 years old with new onset head ache as well as dizziness.

## 2021-09-03 ENCOUNTER — TRANSFERRED RECORDS (OUTPATIENT)
Dept: HEALTH INFORMATION MANAGEMENT | Facility: CLINIC | Age: 71
End: 2021-09-03

## 2021-09-14 ENCOUNTER — HOSPITAL ENCOUNTER (OUTPATIENT)
Dept: MAMMOGRAPHY | Facility: CLINIC | Age: 71
Discharge: HOME | End: 2021-09-14
Attending: NURSE PRACTITIONER | Admitting: NURSE PRACTITIONER
Payer: COMMERCIAL

## 2021-09-14 DIAGNOSIS — Z12.31 VISIT FOR SCREENING MAMMOGRAM: ICD-10-CM

## 2021-09-14 PROCEDURE — 77063 BREAST TOMOSYNTHESIS BI: CPT

## 2021-09-16 ENCOUNTER — TRANSFERRED RECORDS (OUTPATIENT)
Dept: HEALTH INFORMATION MANAGEMENT | Facility: CLINIC | Age: 71
End: 2021-09-16

## 2021-10-16 ENCOUNTER — HEALTH MAINTENANCE LETTER (OUTPATIENT)
Age: 71
End: 2021-10-16

## 2021-12-15 ENCOUNTER — OFFICE VISIT (OUTPATIENT)
Dept: INTERNAL MEDICINE | Facility: CLINIC | Age: 71
End: 2021-12-15
Payer: COMMERCIAL

## 2021-12-15 VITALS
TEMPERATURE: 98.3 F | DIASTOLIC BLOOD PRESSURE: 84 MMHG | HEART RATE: 69 BPM | HEIGHT: 64 IN | SYSTOLIC BLOOD PRESSURE: 118 MMHG | BODY MASS INDEX: 29.04 KG/M2 | WEIGHT: 170.1 LBS | OXYGEN SATURATION: 97 %

## 2021-12-15 DIAGNOSIS — Z13.228 SCREENING FOR ENDOCRINE, NUTRITIONAL, METABOLIC AND IMMUNITY DISORDER: ICD-10-CM

## 2021-12-15 DIAGNOSIS — Z78.0 ASYMPTOMATIC POSTMENOPAUSAL STATUS: ICD-10-CM

## 2021-12-15 DIAGNOSIS — Z13.0 SCREENING FOR ENDOCRINE, NUTRITIONAL, METABOLIC AND IMMUNITY DISORDER: ICD-10-CM

## 2021-12-15 DIAGNOSIS — N39.3 FEMALE STRESS INCONTINENCE: ICD-10-CM

## 2021-12-15 DIAGNOSIS — I10 ESSENTIAL HYPERTENSION: ICD-10-CM

## 2021-12-15 DIAGNOSIS — Z13.29 SCREENING FOR ENDOCRINE, NUTRITIONAL, METABOLIC AND IMMUNITY DISORDER: ICD-10-CM

## 2021-12-15 DIAGNOSIS — Z00.00 MEDICARE ANNUAL WELLNESS VISIT, SUBSEQUENT: ICD-10-CM

## 2021-12-15 DIAGNOSIS — Z23 NEED FOR VACCINATION: ICD-10-CM

## 2021-12-15 DIAGNOSIS — Z13.21 SCREENING FOR ENDOCRINE, NUTRITIONAL, METABOLIC AND IMMUNITY DISORDER: ICD-10-CM

## 2021-12-15 DIAGNOSIS — Z76.89 ENCOUNTER TO ESTABLISH CARE: ICD-10-CM

## 2021-12-15 DIAGNOSIS — R00.0 RACING HEART BEAT: ICD-10-CM

## 2021-12-15 PROBLEM — H93.13 TINNITUS OF BOTH EARS: Status: ACTIVE | Noted: 2019-03-01

## 2021-12-15 PROBLEM — H90.3 SENSORINEURAL HEARING LOSS OF BOTH EARS: Status: ACTIVE | Noted: 2019-03-01

## 2021-12-15 PROBLEM — Z97.4 WEARS HEARING AID IN BOTH EARS: Status: ACTIVE | Noted: 2019-03-01

## 2021-12-15 PROCEDURE — 99397 PER PM REEVAL EST PAT 65+ YR: CPT | Mod: 25 | Performed by: NURSE PRACTITIONER

## 2021-12-15 PROCEDURE — 90732 PPSV23 VACC 2 YRS+ SUBQ/IM: CPT | Performed by: NURSE PRACTITIONER

## 2021-12-15 PROCEDURE — 99214 OFFICE O/P EST MOD 30 MIN: CPT | Mod: 25 | Performed by: NURSE PRACTITIONER

## 2021-12-15 PROCEDURE — 90471 IMMUNIZATION ADMIN: CPT | Performed by: NURSE PRACTITIONER

## 2021-12-15 PROCEDURE — 90715 TDAP VACCINE 7 YRS/> IM: CPT | Performed by: NURSE PRACTITIONER

## 2021-12-15 PROCEDURE — G0009 ADMIN PNEUMOCOCCAL VACCINE: HCPCS | Mod: 59 | Performed by: NURSE PRACTITIONER

## 2021-12-15 RX ORDER — AMLODIPINE BESYLATE 5 MG/1
5 TABLET ORAL DAILY
Qty: 90 TABLET | Refills: 3 | Status: SHIPPED | OUTPATIENT
Start: 2021-12-15 | End: 2022-12-22

## 2021-12-15 ASSESSMENT — ACTIVITIES OF DAILY LIVING (ADL): CURRENT_FUNCTION: NO ASSISTANCE NEEDED

## 2021-12-15 ASSESSMENT — MIFFLIN-ST. JEOR: SCORE: 1276.57

## 2021-12-15 NOTE — PATIENT INSTRUCTIONS
To set up your bone density scan call [phone].    Future fasting labs.    Please let me know if the heart racing occurs more frequently.  We will order heart monitor to investigate this if needed.    I have referred you to see physical therapy for pelvic floor strengthening.  They will call you to help set up this appointment.    I will see you back in a year with fasting labs, before then if anything comes up.  Patient Education     Prevention Guidelines, Women Ages 65 and Older  Screening tests and vaccines are an important part of managing your health. A screening test is done to find possible disorders or diseases in people who don't have any symptoms. The goal is to find a disease early so lifestyle changes can be made and you can be watched more closely to reduce the risk of disease, or to detect it early enough to treat it most effectively. Screening tests are not considered diagnostic, but are used to determine if more testing is needed. Health counseling is essential, too. Below are guidelines for these, for women ages 65 and older. Talk with your healthcare provider to make sure you re up to date on what you need.  Screening Who needs it How often   Type 2 diabetes or prediabetes All women beginning at age 45 and women without symptoms at any age who are overweight or obese and have 1 or more additional risk factors for diabetes At least every 3 years   Type 2 diabetes All women with prediabetes Every year   Unhealthy alcohol use All women in this age group At routine exams   Blood pressure All women in this age group Yearly checkup if your blood pressure is normal  Normal blood pressure is less than 120/80 mm Hg  If your blood pressure reading is higher than normal, follow the advice of your healthcare provider   Breast cancer All women of average risk Mammograms should be done every 1 or 2 years. Talk with your healthcare provider about your risk factors and how often you need the test and for how  long.   Cervical cancer Only women who had abnormal screening results before age 65 Talk with your healthcare provider   Chlamydia Women at increased risk for infection At routine exams   Colorectal cancer All women at average risk in this age group through age 75 who are in good health. For women ages 76 to 85, talk with your healthcare provider about whether to continue screening. For women 85 and older, screening is not needed. Multiple tests are available and are used at different times. Possible tests include:    Flexible sigmoidoscopy every 5 years, or    Colonoscopy every 10 years, or    CT colonography (virtual colonoscopy) every 5 years, or    Yearly fecal occult blood test, or    Yearly fecal immunochemical test every year, or    Stool DNA test, every 3 years  If you choose a test other than a colonoscopy and have an abnormal test result, you will need to follow-up with a colonoscopy. Talk with your healthcare provider which test is best for you.  Some people should be screened using a different schedule because of their personal or family health history. Talk with your healthcare provider about your health history.   Depression All women in this age group At routine exams   Gonorrhea Sexually active women at increased risk for infection At yearly routine exams   Hepatitis C Anyone at increased risk; 1 time for those born between 1945 and 1965 At routine exams   High cholesterol or triglycerides All women in this age group who are at risk for coronary artery disease At least every 5 years; talk with your healthcare provider about your risk   HIV Women at increased risk for infection At routine exams; talk with your healthcare provider about your risk   Lung cancer Adults ages 55 to 74 who are in fairly good health and are at higher risk for lung cancer    Currently smoke or have quit within the past 15 years    30-pack year smoking history  Eligibility criteria and age limit (possibly up to age 80) may  vary across major organizations Yearly lung cancer screening with a low dose CT scan (LDCT); talk with your healthcare provider   Obesity All women in this age group At yearly routine exams   Osteoporosis All women in this age group Routinely done every 2 years, but repeat as advised by your healthcare provider   Syphilis Women at increased risk for infection At routine exams; talk with your healthcare provider   Thyroid-stimulating hormone (TSH) Only women in this age group with symptoms of thyroid dysfunction Talk with your healthcare provider   Tuberculosis Women at increased risk for infection Talk with your healthcare provider   Vision All women in this age group Every 1 to 2 years; if you have a chronic health condition, ask your healthcare provider if you need exams more often   Vaccine Who needs it How often   Chickenpox (varicella) All women in this age group who have no record of this infection or vaccine 2 doses; second dose should be given at least 4 weeks after the first dose   Hepatitis A Women at increased risk for infection 2 or 3 doses (depending on the vaccine) given at least 6 months apart; talk with your healthcare provider   Hepatitis B Women at increased risk for infection 2 or 3 doses (depending on the vaccine); second dose should be given 1 month after the first dose; if a the third dose, it should be given at least 2 months after the second dose and at least 4 months after the first dose   Haemophilus influenza type B (HIB) Women at increased risk for infection 1 to 3 doses; talk with your healthcare provider   Influenza (flu) All women in this age group Once a year   Pneumococcal conjugate vaccine (PCV13) and pneumococcal polysaccharide vaccine (PPSV23) All women in this age group  PPSV 23: women who have not been vaccinated or have not had infection  PCV 13: women at increased risk for infection PPSV: 1 dose at age 65 or older  PCV 13: 1 dose at age 65 or older; talk with your healthcare  provider   Tetanus/diphtheria/pertussis (Td/Tdap) booster All women in this age group Td every 10 years, or a 1-time dose of Tdap instead of a Td booster after age 18, then Td every 10 years   Zoster (shingles) All women in this age group 2 vaccines are available:    Recombinant zoster vaccine (RZV; Shigrix) is recommended as the preferred shingles vaccine. It's given in a series of 2 doses. The 2nd dose is given 2 to 6 months after the first. This is given even if you've had shingles before or had a previous zoster live vaccine.    Zoster live vaccine live (ZVL; Zostavax) may be given to healthy adults over age 60. It's given in one dose.   Counseling Who needs it How often   Diet and exercise Women who are overweight or obese When diagnosed, and then at routine exams   Fall prevention (exercise and vitamin D supplements) All women in this age group At yearly routine exams   Sexually transmitted infection prevention Women at increased risk for infection-talk with your healthcare provider At routine exams   Use of daily aspirin Women up to age 70 who are at high risk for cardiovascular problems and not at increased risk for bleeding as identified by your healthcare provider When your risk is known   Use of tobacco and the health effects it can cause All women in this age group Every exam   [employer] last reviewed this educational content on 7/1/2020 2000-2021 [employer]. All rights reserved. This information is not intended as a substitute for professional medical care. Always follow your healthcare professional's instructions.

## 2021-12-15 NOTE — PROGRESS NOTES
SUBJECTIVE:   Catarina Mendenhall is a 71 year old female who presents for Preventive Visit.      Patient has been advised of split billing requirements and indicates understanding: Yes   Are you in the first 12 months of your Medicare coverage?  No    The patient presents today to establish care for her annual Medicare wellness visit.    She has not fasted today.  We will have her come back for future fasting labs.    Her mammogram was normal on 9/14/2021.    She is due for follow-up bone density scan, last was done in year 2010.  It was normal this time.    She would like to have her PPSV23 shot and Tdap booster today.    With her lab work she would like a hepatitis C screening done.    She reports a past surgical history of a right knee replacement in January 2019.  No other surgeries.    Mom passed at age 96, had high blood pressure, memory issues.  Her father passed age 76 of blood clots.    There are no cancers in her family.    She reports a previous history of socially smoking, quit completely 8 years ago.  She reports smoking marijuana occasionally.  She reports having 2 alcoholic beverages per week.    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  She reports receiving a  at the "[employer] to escort, and retired from the [employer] about 10 years ago.    She is  has 2 sons, 3 grandchildren and 1 on the way, all are alive and well.    She reports her exercise that she likes to walk with her friends.    She reports waking up in the middle the night about a month ago and had some heart racing.  She denies any chest pain or chest pressure with this no shortness of breath.  She reports holding her breath and then this subsided.  She has not had an episode since.    She also reports worsening stress incontinence.  She reports to  her grandchild and she will have urinary incontinence.  She is has to use a pad on a daily basis.   "She would like to try some pelvic floor therapy for this.    Healthy Habits:     In general, how would you rate your overall health?  Good    Frequency of exercise:  4-5 days/week    Duration of exercise:  45-60 minutes    Do you usually eat at least 4 servings of fruit and vegetables a day, include whole grains    & fiber and avoid regularly eating high fat or \"junk\" foods?  No    Taking medications regularly:  Yes    Medication side effects:  None    Ability to successfully perform activities of daily living:  No assistance needed    Home Safety:  No safety concerns identified    Hearing Impairment:  Feel that people are mumbling or not speaking clearly, need to ask people to speak up or repeat themselves, difficulty understanding soft or whispered speech and difficulty understanding speech on the telephone    In the past 6 months, have you been bothered by leaking of urine? Yes    In general, how would you rate your overall mental or emotional health?  Good      PHQ-2 Total Score: 0    Additional concerns today:  No    Do you feel safe in your environment? Yes    Have you ever done Advance Care Planning? (For example, a Health Directive, POLST, or a discussion with a medical provider or your loved ones about your wishes): No, advance care planning information given to patient to review.  Patient plans to discuss their wishes with loved ones or provider.         Fall risk  Fallen 2 or more times in the past year?: (P) No  Any fall with injury in the past year?: (P) No    Cognitive Screening   1) Repeat 3 items (Leader, Season, Table)    2) Clock draw: NORMAL  3) 3 item recall: Recalls 2 objects  Results: 2 items recalled: COGNITIVE IMPAIRMENT LESS LIKELY    Mini-CogTM Copyright DIPTI Adkins. Licensed by the author for use in [facility]; reprinted with permission (aye@.[facility]). All rights reserved.      Do you have sleep apnea, excessive snoring or daytime drowsiness?: patient has been to the sleep clinic " "and no BERONICA Dx    Reviewed and updated as needed this visit by clinical staff  Tobacco  Allergies  Meds             Reviewed and updated as needed this visit by Provider               Social History     Tobacco Use     Smoking status: Former Smoker     Quit date: 2015     Years since quittin.0     Smokeless tobacco: Never Used     Tobacco comment: 1-2 cigarrettes  a month   Substance Use Topics     Alcohol use: Not on file          Alcohol Use 12/15/2021   Prescreen: >3 drinks/day or >7 drinks/week? No     Current providers sharing in care for this patient include:   Patient Care Team:  Lissette Hancock CNP as PCP - General (Nurse Practitioner - Gerontology)  Sally Garrett APRN CNP as Assigned PCP    The following health maintenance items are reviewed in Epic and correct as of today:  Health Maintenance Due   Topic Date Due     HEPATITIS C SCREENING  Never done     ZOSTER IMMUNIZATION (1 of 2) Never done     LUNG CANCER SCREENING  Never done     DEXA  2016     Lab work is in process  Pneumonia Vaccine:Adults age 65+ who have not received previous Pneumovax (PPSV23) or PCV13 as an adult: Should first be given PCV13 AND then should be given PPSV23 6-12 months after PCV13        Review of Systems  Constitutional, HEENT, cardiovascular, pulmonary, GI, , musculoskeletal, neuro, skin, endocrine and psych systems are negative, except as otherwise noted.    OBJECTIVE:   /84 (BP Location: Right arm, Patient Position: Sitting, Cuff Size: Adult Regular)   Pulse 69   Temp 98.3  F (36.8  C) (Oral)   Ht 1.626 m (5' 4\")   Wt 77.2 kg (170 lb 1.6 oz)   SpO2 97%   BMI 29.20 kg/m   Estimated body mass index is 29.2 kg/m  as calculated from the following:    Height as of this encounter: 1.626 m (5' 4\").    Weight as of this encounter: 77.2 kg (170 lb 1.6 oz).  Physical Exam  GENERAL: healthy, alert and no distress  EYES: Eyes grossly normal to inspection, PERRL and conjunctivae and sclerae " normal  HENT: ear canals and TM's normal, nose and mouth without ulcers or lesions  NECK: no adenopathy, no asymmetry, masses, or scars and thyroid normal to palpation  RESP: lungs clear to auscultation - no rales, rhonchi or wheezes  CV: regular rate and rhythm, normal S1 S2, no S3 or S4, no murmur, click or rub, no peripheral edema and peripheral pulses strong  ABDOMEN: soft, nontender, no hepatosplenomegaly, no masses and bowel sounds normal  MS: no gross musculoskeletal defects noted, no edema  SKIN: no suspicious lesions or rashes  NEURO: Normal strength and tone, mentation intact and speech normal  PSYCH: mentation appears normal, affect normal/bright    Diagnostic Test Results:  Labs reviewed in Epic    ASSESSMENT / PLAN:   Catarina was seen today for physical.    Diagnoses and all orders for this visit:    Medicare annual wellness visit, subsequent: Future fasted labs ordered. Tdap and PPSV23 given today.     Encounter to establish care: Care established today.     Essential hypertension: Blood pressure today was 118/84. She continues on Norvasc and hydrochlorothiazide. Stable.   -     amLODIPine (NORVASC) 5 MG tablet; Take 1 tablet (5 mg) by mouth daily  -     Comprehensive metabolic panel (BMP + Alb, Alk Phos, ALT, AST, Total. Bili, TP); Future    Racing heart beat: Hx of this about a month ago. Woke up with a racing heart beat. She held her breath and this subsided. Has not happened since. Will check a TSH. Update provider if this continues, will order a heart monitor.   -     TSH with free T4 reflex; Future    Female stress incontinence: Long standing history of this, worsening. Will refer for pelvic floor therapy.   -     Physical Therapy Referral; Future    Asymptomatic postmenopausal status: First bone density was done in 2011, was normal. Is due for follow up.   -     DX Hip/Pelvis/Spine; Future    Need for vaccination  -     PPSV23, IM/SUBQ (2+ YRS) - [id number]    Screening for endocrine,  "nutritional, metabolic and immunity disorder  -     CBC with platelets and differential; Future  -     Lipid panel reflex to direct LDL Fasting; Future    Other orders  -     TDAP VACCINE (Adacel, Boostrix)  [[id number]]  -     REVIEW OF HEALTH MAINTENANCE PROTOCOL ORDERS      Patient has been advised of split billing requirements and indicates understanding: Yes  COUNSELING:  Reviewed preventive health counseling, as reflected in patient instructions       Regular exercise       Healthy diet/nutrition    Estimated body mass index is 29.2 kg/m  as calculated from the following:    Height as of this encounter: 1.626 m (5' 4\").    Weight as of this encounter: 77.2 kg (170 lb 1.6 oz).        She reports that she quit smoking about 6 years ago. She has never used smokeless tobacco.      Appropriate preventive services were discussed with this patient, including applicable screening as appropriate for cardiovascular disease, diabetes, osteopenia/osteoporosis, and glaucoma.  As appropriate for age/gender, discussed screening for colorectal cancer, prostate cancer, breast cancer, and cervical cancer. Checklist reviewing preventive services available has been given to the patient.    Reviewed patients plan of care and provided an AVS. The Intermediate Care Plan ( asthma action plan, low back pain action plan, and migraine action plan) for Catarina meets the Care Plan requirement. This Care Plan has been established and reviewed with the Patient.    Counseling Resources:  ATP IV Guidelines  Pooled Cohorts Equation Calculator  Breast Cancer Risk Calculator  Breast Cancer: Medication to Reduce Risk  FRAX Risk Assessment  ICSI Preventive Guidelines  Dietary Guidelines for Americans, 2010  USDA's MyPlate  ASA Prophylaxis  Lung CA Screening    Lissette Hancock CNP  [facility]    Identified Health Risks:  "

## 2021-12-16 ENCOUNTER — MYC MEDICAL ADVICE (OUTPATIENT)
Dept: INTERNAL MEDICINE | Facility: CLINIC | Age: 71
End: 2021-12-16
Payer: COMMERCIAL

## 2021-12-16 DIAGNOSIS — I10 ESSENTIAL HYPERTENSION: Primary | ICD-10-CM

## 2021-12-16 RX ORDER — LOSARTAN POTASSIUM 100 MG/1
100 TABLET ORAL DAILY
Qty: 90 TABLET | Refills: 3
Start: 2021-12-16 | End: 2022-04-21

## 2021-12-17 ENCOUNTER — LAB (OUTPATIENT)
Dept: LAB | Facility: CLINIC | Age: 71
End: 2021-12-17
Payer: COMMERCIAL

## 2021-12-17 DIAGNOSIS — Z13.21 SCREENING FOR ENDOCRINE, NUTRITIONAL, METABOLIC AND IMMUNITY DISORDER: ICD-10-CM

## 2021-12-17 DIAGNOSIS — R00.0 RACING HEART BEAT: ICD-10-CM

## 2021-12-17 DIAGNOSIS — Z13.0 SCREENING FOR ENDOCRINE, NUTRITIONAL, METABOLIC AND IMMUNITY DISORDER: ICD-10-CM

## 2021-12-17 DIAGNOSIS — Z13.29 SCREENING FOR ENDOCRINE, NUTRITIONAL, METABOLIC AND IMMUNITY DISORDER: ICD-10-CM

## 2021-12-17 DIAGNOSIS — Z13.228 SCREENING FOR ENDOCRINE, NUTRITIONAL, METABOLIC AND IMMUNITY DISORDER: ICD-10-CM

## 2021-12-17 DIAGNOSIS — I10 ESSENTIAL HYPERTENSION: ICD-10-CM

## 2021-12-17 LAB
ALBUMIN SERPL-MCNC: 3.6 G/DL (ref 3.5–5)
ALP SERPL-CCNC: 90 U/L (ref 45–120)
ALT SERPL W P-5'-P-CCNC: 18 U/L (ref 0–45)
ANION GAP SERPL CALCULATED.3IONS-SCNC: 8 MMOL/L (ref 5–18)
AST SERPL W P-5'-P-CCNC: 18 U/L (ref 0–40)
BASOPHILS # BLD AUTO: 0 10E3/UL (ref 0–0.2)
BASOPHILS NFR BLD AUTO: 1 %
BILIRUB SERPL-MCNC: 0.6 MG/DL (ref 0–1)
BUN SERPL-MCNC: 20 MG/DL (ref 8–28)
CALCIUM SERPL-MCNC: 9.1 MG/DL (ref 8.5–10.5)
CHLORIDE BLD-SCNC: 107 MMOL/L (ref 98–107)
CHOLEST SERPL-MCNC: 170 MG/DL
CO2 SERPL-SCNC: 28 MMOL/L (ref 22–31)
CREAT SERPL-MCNC: 0.85 MG/DL (ref 0.6–1.1)
EOSINOPHIL # BLD AUTO: 0.4 10E3/UL (ref 0–0.7)
EOSINOPHIL NFR BLD AUTO: 6 %
ERYTHROCYTE [DISTWIDTH] IN BLOOD BY AUTOMATED COUNT: 13.3 % (ref 10–15)
FASTING STATUS PATIENT QL REPORTED: YES
GFR SERPL CREATININE-BSD FRML MDRD: 70 ML/MIN/1.73M2
GLUCOSE BLD-MCNC: 102 MG/DL (ref 70–125)
HCT VFR BLD AUTO: 36.6 % (ref 35–47)
HDLC SERPL-MCNC: 56 MG/DL
HGB BLD-MCNC: 11.9 G/DL (ref 11.7–15.7)
IMM GRANULOCYTES # BLD: 0 10E3/UL
IMM GRANULOCYTES NFR BLD: 0 %
LDLC SERPL CALC-MCNC: 100 MG/DL
LYMPHOCYTES # BLD AUTO: 1.6 10E3/UL (ref 0.8–5.3)
LYMPHOCYTES NFR BLD AUTO: 25 %
MCH RBC QN AUTO: 30.5 PG (ref 26.5–33)
MCHC RBC AUTO-ENTMCNC: 32.5 G/DL (ref 31.5–36.5)
MCV RBC AUTO: 94 FL (ref 78–100)
MONOCYTES # BLD AUTO: 0.6 10E3/UL (ref 0–1.3)
MONOCYTES NFR BLD AUTO: 9 %
NEUTROPHILS # BLD AUTO: 3.7 10E3/UL (ref 1.6–8.3)
NEUTROPHILS NFR BLD AUTO: 59 %
PLATELET # BLD AUTO: 316 10E3/UL (ref 150–450)
POTASSIUM BLD-SCNC: 4.1 MMOL/L (ref 3.5–5)
PROT SERPL-MCNC: 6.1 G/DL (ref 6–8)
RBC # BLD AUTO: 3.9 10E6/UL (ref 3.8–5.2)
SODIUM SERPL-SCNC: 143 MMOL/L (ref 136–145)
TRIGL SERPL-MCNC: 68 MG/DL
TSH SERPL DL<=0.005 MIU/L-ACNC: 1.09 UIU/ML (ref 0.3–5)
WBC # BLD AUTO: 6.3 10E3/UL (ref 4–11)

## 2021-12-17 PROCEDURE — 85025 COMPLETE CBC W/AUTO DIFF WBC: CPT

## 2021-12-17 PROCEDURE — 80053 COMPREHEN METABOLIC PANEL: CPT

## 2021-12-17 PROCEDURE — 80061 LIPID PANEL: CPT

## 2021-12-17 PROCEDURE — 84443 ASSAY THYROID STIM HORMONE: CPT

## 2021-12-17 PROCEDURE — 36415 COLL VENOUS BLD VENIPUNCTURE: CPT

## 2022-01-03 ENCOUNTER — ANCILLARY PROCEDURE (OUTPATIENT)
Dept: BONE DENSITY | Facility: CLINIC | Age: 72
End: 2022-01-03
Attending: NURSE PRACTITIONER
Payer: COMMERCIAL

## 2022-01-03 DIAGNOSIS — Z78.0 ASYMPTOMATIC POSTMENOPAUSAL STATUS: ICD-10-CM

## 2022-01-03 PROCEDURE — 77080 DXA BONE DENSITY AXIAL: CPT | Mod: TC | Performed by: RADIOLOGY

## 2022-01-15 ENCOUNTER — MYC REFILL (OUTPATIENT)
Dept: FAMILY MEDICINE | Facility: CLINIC | Age: 72
End: 2022-01-15
Payer: COMMERCIAL

## 2022-01-15 DIAGNOSIS — I10 ESSENTIAL HYPERTENSION: ICD-10-CM

## 2022-01-18 RX ORDER — HYDROCHLOROTHIAZIDE 25 MG/1
25 TABLET ORAL DAILY
Qty: 90 TABLET | Refills: 3 | Status: SHIPPED | OUTPATIENT
Start: 2022-01-18 | End: 2023-01-25

## 2022-01-18 NOTE — TELEPHONE ENCOUNTER
"Routing refill request to provider for review/approval because:  A break in medication   script    Last Written Prescription Date:  10/22/2019  Last Fill Quantity: 90,  # refills: 3   Last office visit provider:  12/15/21     Requested Prescriptions   Pending Prescriptions Disp Refills     hydrochlorothiazide (HYDRODIURIL) 25 MG tablet 90 tablet 3     Sig: Take 1 tablet (25 mg) by mouth daily       Diuretics (Including Combos) Protocol Passed - 1/15/2022  1:06 PM        Passed - Blood pressure under 140/90 in past 12 months     BP Readings from Last 3 Encounters:   12/15/21 118/84   21 (!) 138/92   20 139/86                 Passed - Recent (12 mo) or future (30 days) visit within the authorizing provider's specialty     Patient has had an office visit with the authorizing provider or a provider within the authorizing providers department within the previous 12 mos or has a future within next 30 days. See \"Patient Info\" tab in inbasket, or \"Choose Columns\" in Meds & Orders section of the refill encounter.              Passed - Medication is active on med list        Passed - Patient is age 18 or older        Passed - No active pregancy on record        Passed - Normal serum creatinine on file in past 12 months     Recent Labs   Lab Test 21  0732   CR 0.85              Passed - Normal serum potassium on file in past 12 months     Recent Labs   Lab Test 21  0732   POTASSIUM 4.1                    Passed - Normal serum sodium on file in past 12 months     Recent Labs   Lab Test 21  0732                 Passed - No positive pregnancy test in past 12 months             Roosevelt Dickinson RN 22 10:46 AM  "

## 2022-02-28 ENCOUNTER — HOSPITAL ENCOUNTER (OUTPATIENT)
Dept: PHYSICAL THERAPY | Facility: REHABILITATION | Age: 72
End: 2022-02-28
Attending: NURSE PRACTITIONER
Payer: COMMERCIAL

## 2022-02-28 DIAGNOSIS — N39.3 FEMALE STRESS INCONTINENCE: ICD-10-CM

## 2022-02-28 DIAGNOSIS — M62.89 HIGH-TONE PELVIC FLOOR DYSFUNCTION IN FEMALE: Primary | ICD-10-CM

## 2022-02-28 PROCEDURE — 97110 THERAPEUTIC EXERCISES: CPT | Mod: GP | Performed by: PHYSICAL THERAPIST

## 2022-02-28 PROCEDURE — 97161 PT EVAL LOW COMPLEX 20 MIN: CPT | Mod: GP | Performed by: PHYSICAL THERAPIST

## 2022-02-28 NOTE — PROGRESS NOTES
[facility]    OUTPATIENT PHYSICAL THERAPY ORTHOPEDIC EVALUATION  PLAN OF TREATMENT FOR OUTPATIENT REHABILITATION  (COMPLETE FOR INITIAL CLAIMS ONLY)  Patient's Last Name, First Name, M.I.  YOB: 1950  Catarina Mendenhall  ASCENCION    Provider s Name:  [facility]   Medical Record No.  [id number]   Start of Care Date:  02/28/22   Onset Date:      Type:     _X__PT   ___OT   ___SLP Medical Diagnosis:  stress incontinence     PT Diagnosis:  stress incontinence, high pelvic floor tone   Visits from SOC:  1      _________________________________________________________________________________  Plan of Treatment/Functional Goals:  manual therapy, motor coordination training           Goals     Goal Description: patient will be able to consistently cough and sneeze without leaking urine  Target Date: 04/11/22       Goal Description: patient will be able to lift her grandhcild without leaking urine   Target Date: 04/11/22                                                                      Therapy Frequency:  other (see comments)  Predicted Duration of Therapy Intervention:  1 time in 4 weeks (patient is going on vacation)    Teresa Joseph, PT                 I CERTIFY THE NEED FOR THESE SERVICES FURNISHED UNDER        THIS PLAN OF TREATMENT AND WHILE UNDER MY CARE     (Physician co-signature of this document indicates review and certification of the therapy plan).                       Certification Date From:  02/28/22   Certification Date To:  05/28/22    Referring Provider:  Lissette Muller CNP    Initial Assessment        See Epic Evaluation Start of Care Date: 02/28/22 02/28/22 0800   Signing Clinician's Name / Credentials   Signing clinician's name / credentials Teresa Joseph PT   Session Number   Session Number 1   Progress Note/Recertification   Progress Note  Completed Date 04/11/22   Recertification Due Date 05/28/22   Ortho Goal 1   Goal Description patient will be able to consistently cough and sneeze without leaking urine   Target Date 04/11/22   Ortho Goal 2   Goal Description patient will be able to lift her grandhcild without leaking urine    Target Date 04/11/22   Subjective Report   Subjective Report See intial garrickal.   Therapeutic Procedure/exercise   Therapeutic Procedures: strength, endurance, ROM, flexibillity minutes (57756) 25   Skilled Intervention educated patient on the workings of the pelvic floor.  She was instructed in engaging the pelvic floor with ADLs that require her abdominal muscles to work such as lifting and coughing and sneezing.  Patient instructed in and performed diaphragmatic breathing 1-2 times every hour throughtout the day.  Was also instructed in using a towel wrapped around her lower ribs to decrease rib angle.  Also instructed patient in knack technqiue to be done 10 times a day.     Patient Response able to demonstrate the exercises correctly.   Education   Learner Patient   Readiness Eager   Plan   Home program exercises as on PTRX and patient to use leakage as feedback for increasing pelvic floor activity and decreasing abdominal pressure   Plan for next session instruct patient in lifting techniques to decrease abdominal pressure and increase pelvic floor activity.   Comments   Comments patient presents with stress incontinence that occurs with coughing and sneezing and lifting her grandchildren.  She would benefit from PT to improve pelvic floor coordination   Pelvic Health Only: Informed Consent   Pelvic Health Informed Consent Statement Discussed with patient/guardian reason for referral regarding pelvic health needs and external/internal pelvic floor muscle examination.  Opportunity provided to ask questions and verbal consent for assessment and intervention was given.   Total Session Time   Timed Code Treatment Minutes 25    Total Treatment Time (sum of timed and untimed services) 55   AMBULATORY CLINICS ONLY-MEDICAL AND TREATMENT DIAGNOSIS   Medical Diagnosis stress incontinence   PT Diagnosis stress incontinence, high pelvic floor tone

## 2022-04-04 ENCOUNTER — HOSPITAL ENCOUNTER (OUTPATIENT)
Dept: PHYSICAL THERAPY | Facility: REHABILITATION | Age: 72
Discharge: HOME | End: 2022-04-04
Payer: COMMERCIAL

## 2022-04-04 DIAGNOSIS — M62.89 HIGH-TONE PELVIC FLOOR DYSFUNCTION IN FEMALE: ICD-10-CM

## 2022-04-04 DIAGNOSIS — N39.3 FEMALE STRESS INCONTINENCE: Primary | ICD-10-CM

## 2022-04-04 PROCEDURE — 97110 THERAPEUTIC EXERCISES: CPT | Mod: GP | Performed by: PHYSICAL THERAPIST

## 2022-04-21 DIAGNOSIS — I10 ESSENTIAL HYPERTENSION: ICD-10-CM

## 2022-04-21 NOTE — TELEPHONE ENCOUNTER
Refill Request  Medication name: Pending Prescriptions:                       Disp   Refills    losartan (COZAAR) 100 MG tablet           90 tab*3            Sig: Take 1 tablet (100 mg) by mouth daily    Who prescribed the medication: ANSHU   Last refill on medication: 12/16/2021  Requested Pharmacy: [facility]  Last appointment with PCP: 12/15/2021  Next appointment: Not due

## 2022-04-25 RX ORDER — LOSARTAN POTASSIUM 100 MG/1
50 TABLET ORAL DAILY
Qty: 45 TABLET | Refills: 3 | Status: SHIPPED | OUTPATIENT
Start: 2022-04-25 | End: 2023-04-27

## 2022-07-07 NOTE — PROGRESS NOTES
[facility]    Outpatient Physical Therapy Discharge Note  Patient: Catarina Mendenhall  : 1950    Beginning/End Dates of Reporting Period:  22 to 22    Referring Provider: Dr. Lissette Hodge    Therapy Diagnosis: stress incontinence     Client Self Report: Was on vacation the last few weeks.  Was able to do exercises fairly consistently.    Didn't have as much leaking with hiking downhill.  Has more issues with a full bladder.   Denies having any urge incontinence.    Didn't have any opportunity to lift grandkids.       Goals:  Goal Identifier     Goal Description patient will be able to consistently cough and sneeze without leaking urine   Target Date 22   Date Met      Progress (detail required for progress note): ongoing.   will leak if she has a full bladder.     Goal Identifier     Goal Description patient will be able to lift her grandhcild without leaking urine    Target Date 22   Date Met      Progress (detail required for progress note):           Plan:    Discharge from therapy  Discharge:    Reason for Discharge: Patient has failed to schedule further appointments.    Equipment Issued: none    Discharge Plan: Patient to continue home program.

## 2022-07-07 NOTE — ADDENDUM NOTE
Encounter addended by: Teresa Joseph, PT on: 7/7/2022 1:54 PM   Actions taken: Episode resolved, Clinical Note Signed, Flowsheet accepted

## 2022-07-09 ENCOUNTER — OFFICE VISIT (OUTPATIENT)
Dept: FAMILY MEDICINE | Facility: CLINIC | Age: 72
End: 2022-07-09
Payer: COMMERCIAL

## 2022-07-09 ENCOUNTER — NURSE TRIAGE (OUTPATIENT)
Dept: NURSING | Facility: CLINIC | Age: 72
End: 2022-07-09

## 2022-07-09 VITALS
WEIGHT: 164 LBS | SYSTOLIC BLOOD PRESSURE: 135 MMHG | HEART RATE: 87 BPM | OXYGEN SATURATION: 98 % | DIASTOLIC BLOOD PRESSURE: 87 MMHG | TEMPERATURE: 98.3 F | RESPIRATION RATE: 17 BRPM | BODY MASS INDEX: 28.15 KG/M2

## 2022-07-09 DIAGNOSIS — N30.01 ACUTE CYSTITIS WITH HEMATURIA: ICD-10-CM

## 2022-07-09 DIAGNOSIS — R39.89 URINARY PROBLEM: Primary | ICD-10-CM

## 2022-07-09 LAB
ALBUMIN UR-MCNC: NEGATIVE MG/DL
APPEARANCE UR: CLEAR
BACTERIA #/AREA URNS HPF: ABNORMAL /HPF
BILIRUB UR QL STRIP: NEGATIVE
COLOR UR AUTO: YELLOW
GLUCOSE UR STRIP-MCNC: NEGATIVE MG/DL
HGB UR QL STRIP: ABNORMAL
KETONES UR STRIP-MCNC: NEGATIVE MG/DL
LEUKOCYTE ESTERASE UR QL STRIP: ABNORMAL
NITRATE UR QL: NEGATIVE
PH UR STRIP: 5.5 [PH] (ref 5–8)
RBC #/AREA URNS AUTO: ABNORMAL /HPF
SP GR UR STRIP: 1.02 (ref 1–1.03)
SQUAMOUS #/AREA URNS AUTO: ABNORMAL /LPF
UROBILINOGEN UR STRIP-ACNC: 0.2 E.U./DL
WBC #/AREA URNS AUTO: ABNORMAL /HPF

## 2022-07-09 PROCEDURE — 99214 OFFICE O/P EST MOD 30 MIN: CPT | Performed by: PHYSICIAN ASSISTANT

## 2022-07-09 PROCEDURE — 81001 URINALYSIS AUTO W/SCOPE: CPT | Performed by: PHYSICIAN ASSISTANT

## 2022-07-09 RX ORDER — CEFUROXIME AXETIL 500 MG/1
500 TABLET ORAL 2 TIMES DAILY
Qty: 10 TABLET | Refills: 0 | Status: SHIPPED | OUTPATIENT
Start: 2022-07-09 | End: 2022-07-09

## 2022-07-09 RX ORDER — CEFUROXIME AXETIL 500 MG/1
500 TABLET ORAL 2 TIMES DAILY
Qty: 10 TABLET | Refills: 0 | Status: SHIPPED | OUTPATIENT
Start: 2022-07-09 | End: 2022-10-19

## 2022-07-09 NOTE — TELEPHONE ENCOUNTER
Telephone call:     Patient was seen in UC and prescribed Ceftin.   [facility] states their computer system was down and they do not have the prescription.   Resent to patient's preferred pharmacy.     Julita Weems RN   07/09/22 3:04 PM  [facility] Nurse Advisor

## 2022-07-09 NOTE — PATIENT INSTRUCTIONS
Results for orders placed or performed in visit on 07/09/22   UA macro with reflex to Microscopic and Culture - Clinc Collect     Status: Abnormal    Specimen: Urine, Clean Catch   Result Value Ref Range    Color Urine Yellow Colorless, Straw, Light Yellow, Yellow    Appearance Urine Clear Clear    Glucose Urine Negative Negative mg/dL    Bilirubin Urine Negative Negative    Ketones Urine Negative Negative mg/dL    Specific Gravity Urine 1.020 1.005 - 1.030    Blood Urine Trace (A) Negative    pH Urine 5.5 5.0 - 8.0    Protein Albumin Urine Negative Negative mg/dL    Urobilinogen Urine 0.2 0.2, 1.0 E.U./dL    Nitrite Urine Negative Negative    Leukocyte Esterase Urine Trace (A) Negative   Urine Microscopic     Status: Abnormal   Result Value Ref Range    Bacteria Urine Few (A) None Seen /HPF    RBC Urine 0-2 0-2 /HPF /HPF    WBC Urine 0-5 0-5 /HPF /HPF    Squamous Epithelials Urine Few (A) None Seen /LPF    Narrative    Urine Culture not indicated     I WOULD LIKE YOU TO FOLLOW UP WITH Lissette Villalpando AND CONSIDER UROLOGY REFERRAL IF NOT IMPROVING

## 2022-07-09 NOTE — PROGRESS NOTES
SUBJECTIVE: Catarina Mendenhall is a 71 year old female who complains of low back ache and pressure in the pubic region without  urinary frequency, urgency and dysurIA x 5 days, without flank pain, fever, chills, or abnormal vaginal discharge or bleeding.     OBJECTIVE:  /87   Pulse 87   Temp 98.3  F (36.8  C)   Resp 17   Wt 74.4 kg (164 lb)   SpO2 98%   Breastfeeding No   BMI 28.15 kg/m       Appears well, in no apparent distress.  Vital signs are normal. CVS exam: S1, S2 normal, no murmur, click, rub or gallop, regular rate and rhythm, chest is clear without rales or wheezing. The abdomen is soft without tenderness, guarding, mass, rebound       Results for orders placed or performed in visit on 07/09/22   UA macro with reflex to Microscopic and Culture - Clinc Collect     Status: Abnormal    Specimen: Urine, Clean Catch   Result Value Ref Range    Color Urine Yellow Colorless, Straw, Light Yellow, Yellow    Appearance Urine Clear Clear    Glucose Urine Negative Negative mg/dL    Bilirubin Urine Negative Negative    Ketones Urine Negative Negative mg/dL    Specific Gravity Urine 1.020 1.005 - 1.030    Blood Urine Trace (A) Negative    pH Urine 5.5 5.0 - 8.0    Protein Albumin Urine Negative Negative mg/dL    Urobilinogen Urine 0.2 0.2, 1.0 E.U./dL    Nitrite Urine Negative Negative    Leukocyte Esterase Urine Trace (A) Negative   Urine Microscopic     Status: Abnormal   Result Value Ref Range    Bacteria Urine Few (A) None Seen /HPF    RBC Urine 0-2 0-2 /HPF /HPF    WBC Urine 0-5 0-5 /HPF /HPF    Squamous Epithelials Urine Few (A) None Seen /LPF    Narrative    Urine Culture not indicated       ASSESSMENT: UTI uncomplicated without evidence of pyelonephritis     Diagnosis Comments   1. Urinary problem  UA macro with reflex to Microscopic and Culture - Clinc Collect, Urine Microscopic    2. Acute cystitis with hematuria  cefuroxime (CEFTIN) 500 MG tablet        PLAN:     I have reviewed her last several  UA's and all have trace blood including today's.  We will await UC but I have advised her to discuss the trace blood with Lissette Villalpando and see if this warrants a urology referral.   return to clinic prn if these symptoms worsen or fail to improve as anticipated.

## 2022-08-01 ENCOUNTER — OFFICE VISIT (OUTPATIENT)
Dept: FAMILY MEDICINE | Facility: CLINIC | Age: 72
End: 2022-08-01
Payer: COMMERCIAL

## 2022-08-01 ENCOUNTER — ANCILLARY PROCEDURE (OUTPATIENT)
Dept: GENERAL RADIOLOGY | Facility: CLINIC | Age: 72
End: 2022-08-01
Attending: PHYSICIAN ASSISTANT
Payer: COMMERCIAL

## 2022-08-01 VITALS
TEMPERATURE: 98.3 F | WEIGHT: 164 LBS | BODY MASS INDEX: 28.15 KG/M2 | OXYGEN SATURATION: 98 % | SYSTOLIC BLOOD PRESSURE: 124 MMHG | HEART RATE: 82 BPM | RESPIRATION RATE: 16 BRPM | DIASTOLIC BLOOD PRESSURE: 87 MMHG
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
## 2022-08-01 DIAGNOSIS — S59.912A INJURY OF LEFT FOREARM, INITIAL ENCOUNTER: ICD-10-CM
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
## 2022-08-01 PROCEDURE — 73090 X-RAY EXAM OF FOREARM: CPT | Mod: TC | Performed by: RADIOLOGY

## 2022-08-01 PROCEDURE — 99214 OFFICE O/P EST MOD 30 MIN: CPT | Performed by: PHYSICIAN ASSISTANT
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
## 2022-08-01 NOTE — PATIENT INSTRUCTIONS
Ice for the next 2-3 days.  Modify activity.  Mild pain is okay as long as it resolves after a minute or 2 of discontinuing the activity.  Decreased level activity if pain is moderate to severe or last longer than a few minutes after discontinuing.  Voltaren gel 4 times a day for the next 1 to 2 weeks can be effective.  Has minimal side effects but can take a few days to begin working and have to be diligent about application.    Ibuprofen 400-600 mg (2-3 of the 200 mg OTC tablets or 400-600 mg of the children's liquid) up to 4 times daily with food or milk  Tylenol 500-1000 mg every 8 hours as needed

## 2022-09-25 ENCOUNTER — HEALTH MAINTENANCE LETTER (OUTPATIENT)
Age: 72
End: 2022-09-25

## 2022-09-28 ENCOUNTER — HOSPITAL ENCOUNTER (OUTPATIENT)
Dept: MAMMOGRAPHY | Facility: CLINIC | Age: 72
Discharge: HOME | End: 2022-09-28
Attending: NURSE PRACTITIONER | Admitting: NURSE PRACTITIONER
Payer: COMMERCIAL

## 2022-09-28 DIAGNOSIS — Z12.31 VISIT FOR SCREENING MAMMOGRAM: ICD-10-CM

## 2022-09-28 PROCEDURE — 77067 SCR MAMMO BI INCL CAD: CPT

## 2022-10-18 ENCOUNTER — OFFICE VISIT (OUTPATIENT)
Dept: FAMILY MEDICINE | Facility: CLINIC | Age: 72
End: 2022-10-18
Payer: COMMERCIAL

## 2022-10-18 VITALS
RESPIRATION RATE: 18 BRPM | TEMPERATURE: 99.1 F | OXYGEN SATURATION: 97 % | DIASTOLIC BLOOD PRESSURE: 83 MMHG | WEIGHT: 170 LBS | HEIGHT: 64 IN | HEART RATE: 67 BPM | BODY MASS INDEX: 29.02 KG/M2 | SYSTOLIC BLOOD PRESSURE: 124 MMHG

## 2022-10-18 DIAGNOSIS — I10 ESSENTIAL HYPERTENSION: ICD-10-CM

## 2022-10-18 DIAGNOSIS — Z87.891 PERSONAL HISTORY OF TOBACCO USE: ICD-10-CM

## 2022-10-18 DIAGNOSIS — M79.622 ARMPIT PAIN, LEFT: Primary | ICD-10-CM

## 2022-10-18 PROCEDURE — G0296 VISIT TO DETERM LDCT ELIG: HCPCS | Performed by: NURSE PRACTITIONER

## 2022-10-18 PROCEDURE — 99213 OFFICE O/P EST LOW 20 MIN: CPT | Performed by: NURSE PRACTITIONER

## 2022-10-18 NOTE — PROGRESS NOTES
Lung Cancer Screening Shared Decision Making Visit     Catarina Mendenhall, a 72 year old female, is eligible for lung cancer screening    History   Smoking Status     Some Days     Packs/day: 0.25     Years: 40.00     Types: Cigarettes     Start date: 9/30/1970     Last attempt to quit: 12/17/2015   Smokeless Tobacco     Never       I have discussed with patient the risks and benefits of screening for lung cancer with low-dose CT.     The risks include:    radiation exposure: one low dose chest CT has as much ionizing radiation as about 15 chest x-rays, or 6 months of background radiation living in [place]      false positives: most findings/nodules are NOT cancer, but some might still require additional diagnostic evaluation, including biopsy    over-diagnosis: some slow growing cancers that might never have been clinically significant will be detected and treated unnecessarily     The benefit of early detection of lung cancer is contingent upon adherence to annual screening or more frequent follow up if indicated.     Furthermore, to benefit from screening, Catarina must be willing and able to undergo diagnostic procedures, if indicated. Although no specific guide is available for determining severity of comorbidities, it is reasonable to withhold screening in patients who have greater mortality risk from other diseases.     We did discuss that the best way to prevent lung cancer is to not smoke.    Some patients may value a numeric estimation of lung cancer risk when evaluating if lung cancer screening is right for them, here is one calculator:    ShouldIScreen    Catarina Mendenhall has declined screening at this time  Answers for HPI/ROS submitted by the patient on 10/18/2022  How many servings of fruits and vegetables do you eat daily?: 2-3  On average, how many sweetened beverages do you drink each day (Examples: soda, juice, sweet tea, etc.  Do NOT count diet or artificially sweetened beverages)?: 0  How  many minutes a day do you exercise enough to make your heart beat faster?: 30 to 60  How many days a week do you exercise enough to make your heart beat faster?: 5  How many days per week do you miss taking your medication?: 0  What is the reason for your visit today?: pain in armpit, wart on finger  When did your symptoms begin?: More than a month  What are your symptoms?: pain in armpit  How would you describe these symptoms?: Moderate  Are your symptoms:: Staying the same  Have you had these symptoms before?: No  Is there anything that makes you feel better?: ibuprophen or tylenol

## 2022-10-18 NOTE — PATIENT INSTRUCTIONS
Lung Cancer Screening   Frequently Asked Questions  If you are at high-risk for lung cancer, getting screened with low-dose computed tomography (LDCT) every year can help save your life. This handout offers answers to some of the most common questions about lung cancer screening. If you have other questions, please call [phone] ([phone]).     What is it?  Lung cancer screening uses special X-ray technology to create an image of your lung tissue. The exam is quick and easy and takes less than 10 seconds. We don t give you any medicine or use any needles. You can eat before and after the exam. You don t need to change your clothes as long as the clothing on your chest doesn t contain metal. But, you do need to be able to hold your breath for at least 6 seconds during the exam.    What is the goal of lung cancer screening?  The goal of lung cancer screening is to save lives. Many times, lung cancer is not found until a person starts having physical symptoms. Lung cancer screening can help detect lung cancer in the earliest stages when it may be easier to treat.    Who should be screened for lung cancer?  We suggest lung cancer screening for anyone who is at high-risk for lung cancer. You are in the high-risk group if you:      are between the ages of 55 and 79, and    have smoked at least 1 pack of cigarettes a day for 20 or more years, and    still smoke or have quit within the past 15 years.    However, if you have a new cough or shortness of breath, you should talk to your doctor before being screened.    Why does it matter if I have symptoms?  Certain symptoms can be a sign that you have a condition in your lungs that should be checked and treated by your doctor. These symptoms include fever, chest pain, a new or changing cough, shortness of breath that you have never felt before, coughing up blood or unexplained weight loss. Having any of these symptoms can greatly affect the results of lung  cancer screening.       Should all smokers get an LDCT lung cancer screening exam?  It depends. Lung cancer screening is for a very specific group of men and women who have a history of heavy smoking over a long period of time (see  Who should be screened for lung cancer  above).  I am in the high-risk group, but have been diagnosed with cancer in the past. Is LDCT lung cancer screening right for me?  In some cases, you should not have LDCT lung screening, such as when your doctor is already following your cancer with CT scan studies. Your doctor will help you decide if LDCT lung screening is right for you.  Do I need to have a screening exam every year?  Yes. If you are in the high-risk group described earlier, you should get an LDCT lung cancer screening exam every year until you are 79, or are no longer willing or able to undergo screening and possible procedures to diagnose and treat lung cancer.  How effective is LDCT at preventing death from lung cancer?  Studies have shown that LDCT lung cancer screening can lower the risk of death from lung cancer by 20 percent in people who are at high-risk.  What are the risks?  There are some risks and limitations of LDCT lung cancer screening. We want to make sure you understand the risks and benefits, so please let us know if you have any questions. Your doctor may want to talk with you more about these risks.    Radiation exposure: As with any exam that uses radiation, there is a very small increased risk of cancer. The amount of radiation in LDCT is small--about the same amount a person would get from a mammogram. Your doctor orders the exam when he or she feels the potential benefits outweigh the risks.    False negatives: No test is perfect, including LDCT. It is possible that you may have a medical condition, including lung cancer, that is not found during your exam. This is called a false negative result.    False positives and more testing: LDCT very often finds  something in the lung that could be cancer, but in fact is not. This is called a false positive result. False positive tests often cause anxiety. To make sure these findings are not cancer, you may need to have more tests. These tests will be done only if you give us permission. Sometimes patients need a treatment that can have side effects, such as a biopsy. For more information on false positives, see  What can I expect from the results?     Findings not related to lung cancer: Your LDCT exam also takes pictures of areas of your body next to your lungs. In a very small number of cases, the CT scan will show an abnormal finding in one of these areas, such as your kidneys, adrenal glands, liver or thyroid. This finding may not be serious, but you may need more tests. Your doctor can help you decide what other tests you may need, if any.  What can I expect from the results?  About 1 out of 4 LDCT exams will find something that may need more tests. Most of the time, these findings are lung nodules. Lung nodules are very small collections of tissue in the lung. These nodules are very common, and the vast majority--more than 97 percent--are not cancer (benign). Most are normal lymph nodes or small areas of scarring from past infections.  But, if a small lung nodule is found to be cancer, the cancer can be cured more than 90 percent of the time. To know if the nodule is cancer, we may need to get more images before your next yearly screening exam. If the nodule has suspicious features (for example, it is large, has an odd shape or grows over time), we will refer you to a specialist for further testing.  Will my doctor also get the results?  Yes. Your doctor will get a copy of your results.  Is it okay to keep smoking now that there s a cancer screening exam?  No. Tobacco is one of the strongest cancer-causing agents. It causes not only lung cancer, but other cancers and cardiovascular (heart) diseases as well. The damage  caused by smoking builds over time. This means that the longer you smoke, the higher your risk of disease. While it is never too late to quit, the sooner you quit, the better.  Where can I find help to quit smoking?  The best way to prevent lung cancer is to stop smoking. If you have already quit smoking, congratulations and keep it up! For help on quitting smoking, please call [employer] at 5-626-QUITNOW ([phone]) or the American [employer] Society at 1-358.224.4056 to find local resources near you.  One-on-one health coaching:  If you d prefer to work individually with a health care provider on tobacco cessation, we offer:      Medication Therapy Management:  Our specially trained pharmacists work closely with you and your doctor to help you quit smoking.  Call [phone] or [phone] (toll free).

## 2022-10-18 NOTE — PROGRESS NOTES
"  Assessment & Plan     Personal history of tobacco use    - Prof Fee: Shared Decision Making Visit for Lung Cancer Screening    Essential hypertension      Armpit pain, left    - VSS  - we discussed Ct Scan of Lung and she does not qualify since she was a some day smoker, about 10 pack year. Asymptomatic  - No abnormality noted to left armpit area. Leaning towards muscular, mammogram reviewed today. Continue to monitor, follow up PRN.   - Area to fingers to do appear as a wart. Maybe healing laceration or wound. Maybe callus of some sort. Continue to monitor.             Nicotine/Tobacco Cessation:  She reports that she quit smoking about 6 years ago. Her smoking use included cigarettes. She started smoking about 52 years ago. She has a 10.00 pack-year smoking history. She has never used smokeless tobacco.    BMI:   Estimated body mass index is 29.18 kg/m  as calculated from the following:    Height as of this encounter: 1.626 m (5' 4\").    Weight as of this encounter: 77.1 kg (170 lb).           Return if symptoms worsen or fail to improve.    JAKE [facility]    Natalie Elmore is a 72 year old, presenting for the following health issues:  Pain (Armpits started 07/01/2022) and Derm Problem (Right hand ring finger, left hand pointer finger)  - left armpit pain. The pain is random, can change locations (sometimes behind the armpit, on the sides, towards scapular). She denied any sort of wound or sign for infection. She is not feeling a lump or mass. No breast changes. Had a mammogram done recently and it was normal. No shoulder pain, ROM intact, no numbness/tingling, or back pain. Does not any repetitive movements.     Has a spot on her ring finger that is pin size. Wondering if it is a wart. Was bigger, now smaller, sometimes hurts. No drainage, redness, or warmth noted.     Pain  Associated symptoms include myalgias. Pertinent negatives include no neck " "pain or rash.   History of Present Illness       Reason for visit:  Pain in armpit, wart on finger  Symptom onset:  More than a month  Symptoms include:  Pain in armpit  Symptom intensity:  Moderate  Symptom progression:  Staying the same  Had these symptoms before:  No  What makes it better:  Ibuprophen or tylenol    She eats 2-3 servings of fruits and vegetables daily.She consumes 0 sweetened beverage(s) daily.She exercises with enough effort to increase her heart rate 30 to 60 minutes per day.  She exercises with enough effort to increase her heart rate 5 days per week.   She is taking medications regularly.             Review of Systems   Constitutional: Negative.    Respiratory: Negative.    Cardiovascular: Negative.    Breasts:  negative.    Musculoskeletal: Positive for myalgias. Negative for neck pain and neck stiffness.        Left armpit soreness, random, intermittent, changes location of pain.    Skin: Negative for color change, pallor, rash and wound.   Psychiatric/Behavioral: The patient is not nervous/anxious.             Objective    /83   Pulse 67   Temp 99.1  F (37.3  C) (Oral)   Resp 18   Ht 1.626 m (5' 4\")   Wt 77.1 kg (170 lb)   SpO2 97%   BMI 29.18 kg/m    Body mass index is 29.18 kg/m .  Physical Exam  Vitals and nursing note reviewed.   Constitutional:       Appearance: Normal appearance. She is normal weight.   Cardiovascular:      Rate and Rhythm: Normal rate.   Skin:     General: Skin is warm and dry.      Capillary Refill: Capillary refill takes less than 2 seconds.      Findings: No abrasion, abscess, acne, bruising, burn, ecchymosis, erythema, signs of injury, laceration, lesion, petechiae, rash or wound.      Comments: No reproductible pain with pressure over left armpit region. No mass or lumps noted. No redness, no rash, no wounds.     Ring fingers with dot size, slightly elevated, smooth, slight scale noted. No black dots noted.    Neurological:      Mental Status: She " is alert.

## 2022-10-19 ASSESSMENT — ENCOUNTER SYMPTOMS
RESPIRATORY NEGATIVE: 1
CARDIOVASCULAR NEGATIVE: 1
NERVOUS/ANXIOUS: 0
CONSTITUTIONAL NEGATIVE: 1
NECK PAIN: 0
MYALGIAS: 1
COLOR CHANGE: 0
WOUND: 0
NECK STIFFNESS: 0

## 2022-12-21 DIAGNOSIS — I10 ESSENTIAL HYPERTENSION: ICD-10-CM

## 2022-12-22 RX ORDER — AMLODIPINE BESYLATE 5 MG/1
TABLET ORAL
Qty: 90 TABLET | Refills: 3 | Status: SHIPPED | OUTPATIENT
Start: 2022-12-22 | End: 2024-01-24

## 2022-12-22 NOTE — TELEPHONE ENCOUNTER
"Routing refill request to provider for review/approval because:  Labs not current:  CR  Patient needs to be seen because it has been more than 1 year since last office visit.    Last Written Prescription Date:  12/15/21  Last Fill Quantity: 90,  # refills: 3   Last office visit provider:   12/15/21    Requested Prescriptions   Pending Prescriptions Disp Refills     amLODIPine (NORVASC) 5 MG tablet [Pharmacy Med Name: AMLODIPINE BESYLATE 5 MG TAB] 90 tablet 3     Sig: TAKE 1 TABLET BY MOUTH EVERY DAY       Calcium Channel Blockers Protocol  Failed - 12/21/2022 12:30 AM        Failed - Recent (12 mo) or future (30 days) visit within the authorizing provider's specialty     Patient has had an office visit with the authorizing provider or a provider within the authorizing providers department within the previous 12 mos or has a future within next 30 days. See \"Patient Info\" tab in inbasket, or \"Choose Columns\" in Meds & Orders section of the refill encounter.              Failed - Normal serum creatinine on file in past 12 months     Recent Labs   Lab Test 12/17/21  0732   CR 0.85       Ok to refill medication if creatinine is low          Passed - Blood pressure under 140/90 in past 12 months     BP Readings from Last 3 Encounters:   10/18/22 124/83   08/01/22 124/87   07/09/22 135/87                 Passed - Medication is active on med list        Passed - Patient is age 18 or older        Passed - No active pregnancy on record        Passed - No positive pregnancy test in past 12 months             Chely Alexander RN 12/21/22 9:57 PM  "

## 2023-01-12 ENCOUNTER — TRANSFERRED RECORDS (OUTPATIENT)
Dept: HEALTH INFORMATION MANAGEMENT | Facility: CLINIC | Age: 73
End: 2023-01-12

## 2023-02-04 ENCOUNTER — HEALTH MAINTENANCE LETTER (OUTPATIENT)
Age: 73
End: 2023-02-04

## 2023-03-08 ENCOUNTER — OFFICE VISIT (OUTPATIENT)
Dept: INTERNAL MEDICINE | Facility: CLINIC | Age: 73
End: 2023-03-08
Payer: COMMERCIAL

## 2023-03-08 VITALS
DIASTOLIC BLOOD PRESSURE: 80 MMHG | TEMPERATURE: 97.3 F | RESPIRATION RATE: 18 BRPM | BODY MASS INDEX: 27.82 KG/M2 | OXYGEN SATURATION: 99 % | SYSTOLIC BLOOD PRESSURE: 110 MMHG | HEIGHT: 65 IN | HEART RATE: 70 BPM | WEIGHT: 167 LBS

## 2023-03-08 DIAGNOSIS — Z13.29 SCREENING FOR ENDOCRINE, NUTRITIONAL, METABOLIC AND IMMUNITY DISORDER: ICD-10-CM

## 2023-03-08 DIAGNOSIS — Z13.0 SCREENING FOR ENDOCRINE, NUTRITIONAL, METABOLIC AND IMMUNITY DISORDER: ICD-10-CM

## 2023-03-08 DIAGNOSIS — Z00.00 MEDICARE ANNUAL WELLNESS VISIT, SUBSEQUENT: ICD-10-CM

## 2023-03-08 DIAGNOSIS — M85.80 OSTEOPENIA AFTER MENOPAUSE: ICD-10-CM

## 2023-03-08 DIAGNOSIS — Z13.228 SCREENING FOR ENDOCRINE, NUTRITIONAL, METABOLIC AND IMMUNITY DISORDER: ICD-10-CM

## 2023-03-08 DIAGNOSIS — E55.9 VITAMIN D DEFICIENCY: ICD-10-CM

## 2023-03-08 DIAGNOSIS — I10 ESSENTIAL HYPERTENSION: ICD-10-CM

## 2023-03-08 DIAGNOSIS — Z78.0 OSTEOPENIA AFTER MENOPAUSE: ICD-10-CM

## 2023-03-08 DIAGNOSIS — Z13.21 SCREENING FOR ENDOCRINE, NUTRITIONAL, METABOLIC AND IMMUNITY DISORDER: ICD-10-CM

## 2023-03-08 PROBLEM — N39.3 STRESS INCONTINENCE OF URINE: Status: ACTIVE | Noted: 2023-03-08

## 2023-03-08 LAB
ALBUMIN SERPL BCG-MCNC: 4.3 G/DL (ref 3.5–5.2)
ALP SERPL-CCNC: 89 U/L (ref 35–104)
ALT SERPL W P-5'-P-CCNC: 20 U/L (ref 10–35)
ANION GAP SERPL CALCULATED.3IONS-SCNC: 11 MMOL/L (ref 7–15)
AST SERPL W P-5'-P-CCNC: 22 U/L (ref 10–35)
BASOPHILS # BLD AUTO: 0.1 10E3/UL (ref 0–0.2)
BASOPHILS NFR BLD AUTO: 1 %
BILIRUB SERPL-MCNC: 0.5 MG/DL
BUN SERPL-MCNC: 21.1 MG/DL (ref 8–23)
CALCIUM SERPL-MCNC: 9.4 MG/DL (ref 8.8–10.2)
CHLORIDE SERPL-SCNC: 105 MMOL/L (ref 98–107)
CHOLEST SERPL-MCNC: 171 MG/DL
CREAT SERPL-MCNC: 1.01 MG/DL (ref 0.51–0.95)
DEPRECATED CALCIDIOL+CALCIFEROL SERPL-MC: 48 UG/L (ref 20–75)
DEPRECATED HCO3 PLAS-SCNC: 24 MMOL/L (ref 22–29)
EOSINOPHIL # BLD AUTO: 0.3 10E3/UL (ref 0–0.7)
EOSINOPHIL NFR BLD AUTO: 5 %
ERYTHROCYTE [DISTWIDTH] IN BLOOD BY AUTOMATED COUNT: 12.9 % (ref 10–15)
GFR SERPL CREATININE-BSD FRML MDRD: 59 ML/MIN/1.73M2
GLUCOSE SERPL-MCNC: 99 MG/DL (ref 70–99)
HCT VFR BLD AUTO: 40.5 % (ref 35–47)
HDLC SERPL-MCNC: 60 MG/DL
HGB BLD-MCNC: 13.2 G/DL (ref 11.7–15.7)
IMM GRANULOCYTES # BLD: 0 10E3/UL
IMM GRANULOCYTES NFR BLD: 0 %
LDLC SERPL CALC-MCNC: 98 MG/DL
LYMPHOCYTES # BLD AUTO: 1.6 10E3/UL (ref 0.8–5.3)
LYMPHOCYTES NFR BLD AUTO: 26 %
MCH RBC QN AUTO: 30.4 PG (ref 26.5–33)
MCHC RBC AUTO-ENTMCNC: 32.6 G/DL (ref 31.5–36.5)
MCV RBC AUTO: 93 FL (ref 78–100)
MONOCYTES # BLD AUTO: 0.6 10E3/UL (ref 0–1.3)
MONOCYTES NFR BLD AUTO: 10 %
NEUTROPHILS # BLD AUTO: 3.4 10E3/UL (ref 1.6–8.3)
NEUTROPHILS NFR BLD AUTO: 57 %
NONHDLC SERPL-MCNC: 111 MG/DL
PLATELET # BLD AUTO: 311 10E3/UL (ref 150–450)
POTASSIUM SERPL-SCNC: 4.3 MMOL/L (ref 3.4–5.3)
PROT SERPL-MCNC: 6.8 G/DL (ref 6.4–8.3)
RBC # BLD AUTO: 4.34 10E6/UL (ref 3.8–5.2)
SODIUM SERPL-SCNC: 140 MMOL/L (ref 136–145)
TRIGL SERPL-MCNC: 63 MG/DL
VIT B12 SERPL-MCNC: 720 PG/ML (ref 232–1245)
WBC # BLD AUTO: 5.9 10E3/UL (ref 4–11)

## 2023-03-08 PROCEDURE — 82306 VITAMIN D 25 HYDROXY: CPT | Performed by: NURSE PRACTITIONER

## 2023-03-08 PROCEDURE — G0439 PPPS, SUBSEQ VISIT: HCPCS | Performed by: NURSE PRACTITIONER

## 2023-03-08 PROCEDURE — 36415 COLL VENOUS BLD VENIPUNCTURE: CPT | Performed by: NURSE PRACTITIONER

## 2023-03-08 PROCEDURE — 82607 VITAMIN B-12: CPT | Performed by: NURSE PRACTITIONER

## 2023-03-08 PROCEDURE — 85025 COMPLETE CBC W/AUTO DIFF WBC: CPT | Performed by: NURSE PRACTITIONER

## 2023-03-08 PROCEDURE — 80061 LIPID PANEL: CPT | Performed by: NURSE PRACTITIONER

## 2023-03-08 PROCEDURE — 80053 COMPREHEN METABOLIC PANEL: CPT | Performed by: NURSE PRACTITIONER

## 2023-03-08 RX ORDER — MULTIVITAMIN
1 TABLET ORAL DAILY
COMMUNITY
Start: 2023-03-08

## 2023-03-08 ASSESSMENT — ENCOUNTER SYMPTOMS
FREQUENCY: 0
CONSTIPATION: 0
HEADACHES: 0
FEVER: 0
HEARTBURN: 0
PARESTHESIAS: 0
SORE THROAT: 0
EYE PAIN: 0
ABDOMINAL PAIN: 0
NAUSEA: 0
BREAST MASS: 0
DIARRHEA: 0
HEMATURIA: 0
MYALGIAS: 0
NERVOUS/ANXIOUS: 0
HEMATOCHEZIA: 0
DIZZINESS: 0
PALPITATIONS: 0
DYSURIA: 0
ARTHRALGIAS: 0
JOINT SWELLING: 0
CHILLS: 0
COUGH: 0
SHORTNESS OF BREATH: 0

## 2023-03-08 ASSESSMENT — ACTIVITIES OF DAILY LIVING (ADL): CURRENT_FUNCTION: NO ASSISTANCE NEEDED

## 2023-03-08 NOTE — PROGRESS NOTES
"SUBJECTIVE:   Catarina is a 72 year old who presents for Preventive Visit.  Patient has been advised of split billing requirements and indicates understanding: Yes  Are you in the first 12 months of your Medicare coverage?  No    The patient presents today for her Medicare Annual Wellness Visit. She is fasted today. She reports that she is unsure how much calcium and Vitamin D she is taking. She is taking the Childs signature gummies, encouraged her to ensure she is getting in 1200mg daily of Calcium along with Vitamin D 800 units, between supplement and her oral intake from food.     She last had a bone density scan done on 01/03/2022; showed osteopenia, recheck in 2 years. Colonoscopy is due 10/30/2019; follow up in 5 years. Mammogram is due after 09/28/2022.    She is due for the shingles vaccine, she will get this done at [facility].    She reports that she still has some rapid heart beats, at night that come and go. This is happening every few weeks, will wake up from it, and then it goes away, it is not predictable. She will pay more attention to this. No shortness of breath, chest pain, or chest pressure with this.     She is at a very low risk for hepatitis C, will discontinue this health screening.     Healthy Habits:     In general, how would you rate your overall health?  Good    Frequency of exercise:  4-5 days/week    Duration of exercise:  45-60 minutes    Do you usually eat at least 4 servings of fruit and vegetables a day, include whole grains    & fiber and avoid regularly eating high fat or \"junk\" foods?  Yes    Taking medications regularly:  Yes    Medication side effects:  None    Ability to successfully perform activities of daily living:  No assistance needed    Home Safety:  No safety concerns identified    Hearing Impairment:  Difficulty following a conversation in a noisy restaurant or crowded room, difficult to understand a speaker at a public meeting or [employer] service and need to ask " people to speak up or repeat themselves    In the past 6 months, have you been bothered by leaking of urine?  No    In general, how would you rate your overall mental or emotional health?  Good      PHQ-2 Total Score: 0    Additional concerns today:  No      Have you ever done Advance Care Planning? (For example, a Health Directive, POLST, or a discussion with a medical provider or your loved ones about your wishes): Yes, patient states has an Advance Care Planning document and will bring a copy to the clinic.    Fall risk  Fallen 2 or more times in the past year?: No (Patient entered data at this time. Check the audit trail.)  Any fall with injury in the past year?: No (Patient entered data at this time. Check the audit trail.)    Cognitive Screening   1) Repeat 3 items (Leader, Season, Table)    2) Clock draw: NORMAL  3) 3 item recall: Recalls 3 objects  Results: 3 items recalled: COGNITIVE IMPAIRMENT LESS LIKELY    Mini-CogTM Copyright DIPTI Adkins. Licensed by the author for use in [facility]; reprinted with permission (aye@[facility]). All rights reserved.      Do you have sleep apnea, excessive snoring or daytime drowsiness?: no    Reviewed and updated as needed this visit by clinical staff   Tobacco  Allergies  Meds              Reviewed and updated as needed this visit by Provider                 Social History     Tobacco Use     Smoking status: Former     Packs/day: 0.25     Years: 40.00     Pack years: 10.00     Types: Cigarettes     Start date: 1970     Quit date: 2015     Years since quittin.2     Smokeless tobacco: Never   Substance Use Topics     Alcohol use: Not Currently     Alcohol/week: 3.0 standard drinks     Types: 3 Standard drinks or equivalent per week         Alcohol Use 3/8/2023   Prescreen: >3 drinks/day or >7 drinks/week? No     Current providers sharing in care for this patient include:  Patient Care Team:  Lissette Villalpando CNP as PCP - General (Nurse  Practitioner - Gerontology)  Lissette Villalpando CNP as Assigned PCP    The following health maintenance items are reviewed in Epic and correct as of today:  Health Maintenance   Topic Date Due     ZOSTER IMMUNIZATION (1 of 2) Never done     MEDICARE ANNUAL WELLNESS VISIT  03/08/2024     ANNUAL REVIEW OF HM ORDERS  03/08/2024     FALL RISK ASSESSMENT  03/08/2024     MAMMO SCREENING  09/28/2024     LIPID  12/17/2026     DEXA  01/03/2027     ADVANCE CARE PLANNING  03/08/2028     COLORECTAL CANCER SCREENING  10/30/2029     DTAP/TDAP/TD IMMUNIZATION (4 - Td or Tdap) 12/15/2031     PHQ-2 (once per calendar year)  Completed     INFLUENZA VACCINE  Completed     Pneumococcal Vaccine: 65+ Years  Completed     COVID-19 Vaccine  Completed     IPV IMMUNIZATION  Aged Out     MENINGITIS IMMUNIZATION  Aged Out     HEPATITIS C SCREENING  Discontinued     LUNG CANCER SCREENING  Discontinued     Lab work is in process  Mammogram Screening: Mammogram Screening: Recommended mammography every 1-2 years with patient discussion and risk factor consideration        Review of Systems   Constitutional: Negative for chills and fever.   HENT: Positive for hearing loss. Negative for congestion, ear pain and sore throat.    Eyes: Negative for pain and visual disturbance.   Respiratory: Negative for cough and shortness of breath.    Cardiovascular: Negative for chest pain, palpitations and peripheral edema.   Gastrointestinal: Negative for abdominal pain, constipation, diarrhea, heartburn, hematochezia and nausea.   Breasts:  Negative for tenderness, breast mass and discharge.   Genitourinary: Negative for dysuria, frequency, genital sores, hematuria, pelvic pain, urgency, vaginal bleeding and vaginal discharge.   Musculoskeletal: Negative for arthralgias, joint swelling and myalgias.   Skin: Negative for rash.   Neurological: Negative for dizziness, headaches and paresthesias.   Psychiatric/Behavioral: Negative for mood changes. The patient is  "not nervous/anxious.      OBJECTIVE:   /80 (BP Location: Right arm, Patient Position: Sitting)   Pulse 70   Temp 97.3  F (36.3  C)   Resp 18   Ht 1.638 m (5' 4.5\")   Wt 75.8 kg (167 lb)   SpO2 99%   BMI 28.22 kg/m   Estimated body mass index is 28.22 kg/m  as calculated from the following:    Height as of this encounter: 1.638 m (5' 4.5\").    Weight as of this encounter: 75.8 kg (167 lb).  Physical Exam  GENERAL APPEARANCE: healthy, alert and no distress  EYES: Eyes grossly normal to inspection, PERRL and conjunctivae and sclerae normal  HENT: ear canals and TM's normal, nose and mouth without ulcers or lesions, oropharynx clear and oral mucous membranes moist  NECK: no adenopathy, no asymmetry, masses, or scars and thyroid normal to palpation  RESP: lungs clear to auscultation - no rales, rhonchi or wheezes  CV: regular rate and rhythm, normal S1 S2, no S3 or S4, no murmur, click or rub, no peripheral edema and peripheral pulses strong  ABDOMEN: soft, nontender, no hepatosplenomegaly, no masses and bowel sounds normal  MS: no musculoskeletal defects are noted and gait is age appropriate without ataxia  SKIN: no suspicious lesions or rashes  NEURO: Normal strength and tone, sensory exam grossly normal, mentation intact and speech normal  PSYCH: mentation appears normal and affect normal/bright    Diagnostic Test Results: Mammogram, Dexa, Colonoscopy  Labs reviewed in Epic    ASSESSMENT / PLAN:   Catarina was seen today for wellness visit.    Diagnoses and all orders for this visit:    Medicare annual wellness visit, subsequent: Completed today. Fasted labs drawn.     Essential hypertension: Blood pressure today was 110/80. She continues on Losartan, hydrochlorothiazide, and Norvasc. Stable.     Osteopenia after menopause: Bone density to be rechecked 01/2024. She continues with weight bearing exercise, Calcium and Vitamin D.     Vitamin D deficiency: Will check levels today.   -     Vitamin D " Deficiency    Screening for endocrine, nutritional, metabolic and immunity disorder  -     CBC with platelets and differential; Future  -     Comprehensive metabolic panel (BMP + Alb, Alk Phos, ALT, AST, Total. Bili, TP); Future  -     Lipid panel reflex to direct LDL Fasting; Future  -     Vitamin B12; Future  Other orders  -     REVIEW OF HEALTH MAINTENANCE PROTOCOL ORDERS    Patient has been advised of split billing requirements and indicates understanding: Yes      COUNSELING:  Reviewed preventive health counseling, as reflected in patient instructions  Special attention given to:       Regular exercise       Healthy diet/nutrition       Vision screening        She reports that she quit smoking about 7 years ago. Her smoking use included cigarettes. She started smoking about 52 years ago. She has a 10.00 pack-year smoking history. She has never used smokeless tobacco.      Appropriate preventive services were discussed with this patient, including applicable screening as appropriate for cardiovascular disease, diabetes, osteopenia/osteoporosis, and glaucoma.  As appropriate for age/gender, discussed screening for colorectal cancer, prostate cancer, breast cancer, and cervical cancer. Checklist reviewing preventive services available has been given to the patient.    Reviewed patients plan of care and provided an AVS. The Basic Care Plan (routine screening as documented in Health Maintenance) for Catarina meets the Care Plan requirement. This Care Plan has been established and reviewed with the Patient.    Lissette Villalpando CNP  [facility]    Identified Health Risks: Osteopenia

## 2023-03-08 NOTE — PATIENT INSTRUCTIONS
Get your shingles vaccines at [facility].    For calcium and vitamin D supplementation: Take 1200 mg of calcium, at least 800 units of vitamin D per day.  This can also come from a combination of supplementation and food sources.    For the compression stockings, 20 to 30 mmHg are very helpful.  Put the compression socks on in the morning, take them off at bedtime.    Your mammogram is due after 9/28/2023, colonoscopy is due after 10/30/2024, bone density scan for follow-up would be due after 1/3/2024.    I will see you back in a year with fasting labs, before then if anything comes up.

## 2023-04-19 ENCOUNTER — TRANSFERRED RECORDS (OUTPATIENT)
Dept: HEALTH INFORMATION MANAGEMENT | Facility: CLINIC | Age: 73
End: 2023-04-19

## 2023-04-26 DIAGNOSIS — I10 ESSENTIAL HYPERTENSION: ICD-10-CM

## 2023-04-27 RX ORDER — LOSARTAN POTASSIUM 100 MG/1
TABLET ORAL
Qty: 45 TABLET | Refills: 3 | Status: SHIPPED | OUTPATIENT
Start: 2023-04-27 | End: 2024-04-19

## 2023-04-27 NOTE — TELEPHONE ENCOUNTER
"Routing refill request to provider for review/approval because:  Labs out of range:  Serum creatinine    Last Written Prescription Date: 4/25/2022  Last Fill Quantity: 45,  # refills: 3   Last office visit provider: 3/8/2023 with PCP DAYLIN Villalpando CNP     Requested Prescriptions   Pending Prescriptions Disp Refills     losartan (COZAAR) 100 MG tablet [Pharmacy Med Name: LOSARTAN POTASSIUM 100 MG TAB] 45 tablet 3     Sig: TAKE 1/2 OF A TABLET (50 MG) BY MOUTH DAILY       Angiotensin-II Receptors Failed - 4/26/2023 12:58 AM        Failed - Normal serum creatinine on file in past 12 months     Recent Labs   Lab Test 03/08/23  0839   CR 1.01*       Ok to refill medication if creatinine is low          Passed - Last blood pressure under 140/90 in past 12 months     BP Readings from Last 3 Encounters:   03/08/23 110/80   10/18/22 124/83   08/01/22 124/87                 Passed - Recent (12 mo) or future (30 days) visit within the authorizing provider's specialty     Patient has had an office visit with the authorizing provider or a provider within the authorizing providers department within the previous 12 mos or has a future within next 30 days. See \"Patient Info\" tab in inbasket, or \"Choose Columns\" in Meds & Orders section of the refill encounter.              Passed - Medication is active on med list        Passed - Patient is age 18 or older        Passed - No active pregnancy on record        Passed - Normal serum potassium on file in past 12 months     Recent Labs   Lab Test 03/08/23  0839   POTASSIUM 4.3                    Passed - No positive pregnancy test in past 12 months             Luzmaria Fernandes RN 04/26/23 9:18 PM  "

## 2023-05-05 ENCOUNTER — OFFICE VISIT (OUTPATIENT)
Dept: INTERNAL MEDICINE | Facility: CLINIC | Age: 73
End: 2023-05-05
Payer: COMMERCIAL

## 2023-05-05 VITALS
DIASTOLIC BLOOD PRESSURE: 72 MMHG | SYSTOLIC BLOOD PRESSURE: 102 MMHG | WEIGHT: 169 LBS | OXYGEN SATURATION: 97 % | RESPIRATION RATE: 16 BRPM | BODY MASS INDEX: 28.16 KG/M2 | HEIGHT: 65 IN | HEART RATE: 67 BPM | TEMPERATURE: 97.9 F

## 2023-05-05 DIAGNOSIS — B07.8 COMMON WART: ICD-10-CM

## 2023-05-05 DIAGNOSIS — Z01.818 PREOP GENERAL PHYSICAL EXAM: Primary | ICD-10-CM

## 2023-05-05 DIAGNOSIS — E55.9 VITAMIN D DEFICIENCY: ICD-10-CM

## 2023-05-05 DIAGNOSIS — N39.3 FEMALE STRESS INCONTINENCE: ICD-10-CM

## 2023-05-05 DIAGNOSIS — I10 ESSENTIAL HYPERTENSION: ICD-10-CM

## 2023-05-05 PROCEDURE — 99214 OFFICE O/P EST MOD 30 MIN: CPT | Performed by: NURSE PRACTITIONER

## 2023-05-05 NOTE — H&P (VIEW-ONLY)
[facility]  [address] [place] [id number]  Phone: [phone]  Fax: [phone]  Primary Provider: Lalit Brasher  Pre-op Performing Provider: LALIT BRASHER    PREOPERATIVE EVALUATION:  Today's date: 5/5/2023    Catarina Mendenhall is a 72 year old female who presents for a preoperative evaluation.      5/5/2023     8:18 AM   Additional Questions   Roomed by Domenica SNYDER     Surgical Information:  Surgery/Procedure: Cystoscopy with bulking   Surgery Location: [facility]  Surgeon: Dr Sander Whitt  Surgery Date: 5/15/23  Time of Surgery:   Where patient plans to recover: At home with family  Fax number for surgical facility: Note does not need to be faxed, will be available electronically in Epic.    Assessment & Plan     The proposed surgical procedure is considered LOW risk.    Preop general physical exam: Completed today. No labs or EKG are needed. She will hold her vitamins and NSAIDS one week prior to surgery. Will follow up if having new symptoms.     Female stress incontinence: Long standing history of this, to have a cystoscopy completed.     Essential hypertension: Blood pressure today was 102/72. She continues on hydrochlorothiazide, Losartan, and Norvasc. Stable.     Vitamin D deficiency: hx of this, remains on supplement.     Common wart: finger, frozen with liquid nitrogen, she tolerated this well.        - No identified additional risk factors other than previously addressed    Antiplatelet or Anticoagulation Medication Instructions:   - Patient is on no antiplatelet or anticoagulation medications.    Additional Medication Instructions:  Hold Vitamins and NSAIDS 7 days prior to surgery.    RECOMMENDATION:  APPROVAL GIVEN to proceed with proposed procedure, without further diagnostic evaluation.    Subjective     HPI related to upcoming procedure:         5/5/2023     8:15 AM   Preop Questions   1. Have you ever had a heart attack or stroke? No   2.  Have you ever had surgery on your heart or blood vessels, such as a stent placement, a coronary artery bypass, or surgery on an artery in your head, neck, heart, or legs? No   3. Do you have chest pain with activity? No   4. Do you have a history of  heart failure? No   5. Do you currently have a cold, bronchitis or symptoms of other infection? No   6. Do you have a cough, shortness of breath, or wheezing? No   7. Do you or anyone in your family have previous history of blood clots? YES - Her Dad   8. Do you or does anyone in your family have a serious bleeding problem such as prolonged bleeding following surgeries or cuts? No   9. Have you ever had problems with anemia or been told to take iron pills? No   10. Have you had any abnormal blood loss such as black, tarry or bloody stools, or abnormal vaginal bleeding? No   11. Have you ever had a blood transfusion? No   12. Are you willing to have a blood transfusion if it is medically needed before, during, or after your surgery? Yes   13. Have you or any of your relatives ever had problems with anesthesia? No   14. Do you have sleep apnea, excessive snoring or daytime drowsiness? No   15. Do you have any artifical heart valves or other implanted medical devices like a pacemaker, defibrillator, or continuous glucose monitor? No   16. Do you have artificial joints? YES - Right total knee   17. Are you allergic to latex? No       Health Care Directive:  Patient does not have a Health Care Directive or Living Will: Full Code    Preoperative Review of :   reviewed - no record of controlled substances prescribed.    Review of Systems  Constitutional, neuro, ENT, endocrine, pulmonary, cardiac, gastrointestinal, genitourinary, musculoskeletal, integument and psychiatric systems are negative, except as otherwise noted.    Patient Active Problem List    Diagnosis Date Noted     Stress incontinence of urine 03/08/2023     Priority: Medium     Sensorineural hearing loss of  both ears 2019     Priority: Medium     Tinnitus of both ears 2019     Priority: Medium     Wears hearing aid in both ears 2019     Priority: Medium     Adenomatous colon polyp (2014) 2017     Priority: Medium     Hypertension      Priority: Medium     Created by Conversion  Replacement Utility updated for latest IMO load         Urge And Stress Incontinence      Priority: Medium     Created by Conversion          Past Medical History:   Diagnosis Date     Hypertension      Urge incontinence      Past Surgical History:   Procedure Laterality Date     NO PAST SURGERIES       Current Outpatient Medications   Medication Sig Dispense Refill     amLODIPine (NORVASC) 5 MG tablet TAKE 1 TABLET BY MOUTH EVERY DAY 90 tablet 3     hydrochlorothiazide (HYDRODIURIL) 25 MG tablet TAKE 1 TABLET BY MOUTH EVERY DAY (Patient taking differently: Take 12.5 mg by mouth daily) 90 tablet 0     losartan (COZAAR) 100 MG tablet TAKE 1/2 OF A TABLET (50 MG) BY MOUTH DAILY 45 tablet 3     multivitamin (ONE-DAILY) tablet Take 1 tablet by mouth daily       vit A/vit C/vit E/zinc/copper (PRESERVISION AREDS ORAL) [VIT A/VIT C/VIT E/ZINC/COPPER (PRESERVISION AREDS ORAL)] Take by mouth.         No Known Allergies     Social History     Tobacco Use     Smoking status: Former     Packs/day: 0.25     Years: 40.00     Pack years: 10.00     Types: Cigarettes     Start date: 1970     Quit date: 2015     Years since quittin.3     Smokeless tobacco: Never   Vaping Use     Vaping status: Never Used   Substance Use Topics     Alcohol use: Not Currently     Alcohol/week: 3.0 standard drinks of alcohol     Types: 3 Standard drinks or equivalent per week     Family History   Problem Relation Age of Onset     Cancer Paternal Grandmother         Unsure what kind of cancer.  Age in 40's     Hypertension Mother      Coronary Artery Disease Father      Clotting Disorder Father      Breast Cancer No family hx of      History  "  Drug Use No         Objective     /72 (BP Location: Right arm, Patient Position: Sitting)   Pulse 67   Temp 97.9  F (36.6  C)   Resp 16   Ht 1.638 m (5' 4.5\")   Wt 76.7 kg (169 lb)   SpO2 97%   BMI 28.56 kg/m      Physical Exam    GENERAL APPEARANCE: healthy, alert and no distress     EYES: EOMI, PERRL     HENT: ear canals and TM's normal and nose and mouth without ulcers or lesions     NECK: no adenopathy, no asymmetry, masses, or scars and thyroid normal to palpation     RESP: lungs clear to auscultation - no rales, rhonchi or wheezes     CV: regular rates and rhythm, normal S1 S2, no S3 or S4 and no murmur, click or rub     ABDOMEN:  soft, nontender, no HSM or masses and bowel sounds normal     MS: extremities normal- no gross deformities noted, no evidence of inflammation in joints, FROM in all extremities.     SKIN: common wart, left pointer finger.      NEURO: Normal strength and tone, sensory exam grossly normal, mentation intact and speech normal     PSYCH: mentation appears normal. and affect normal/bright     LYMPHATICS: No cervical adenopathy    Recent Labs   Lab Test 03/08/23  0839 12/17/21  0732   HGB 13.2 11.9    316    143   POTASSIUM 4.3 4.1   CR 1.01* 0.85        Diagnostics:  No labs were ordered during this visit.   No EKG required for low risk surgery (cataract, skin procedure, breast biopsy, etc).  No EKG required, no history of coronary heart disease, significant arrhythmia, peripheral arterial disease or other structural heart disease.    Revised Cardiac Risk Index (RCRI):  The patient has the following serious cardiovascular risks for perioperative complications:   - No serious cardiac risks = 0 points     RCRI Interpretation: 0 points: Class I (very low risk - 0.4% complication rate)           Signed Electronically by: Lissette Villalpando CNP  Copy of this evaluation report is provided to requesting physician.      "

## 2023-05-05 NOTE — PROGRESS NOTES
[facility]  [address] [place] [id number]  Phone: [phone]  Fax: [phone]  Primary Provider: Lalit Brasher  Pre-op Performing Provider: LALIT BRASHER    PREOPERATIVE EVALUATION:  Today's date: 5/5/2023    Catarina Mendenhall is a 72 year old female who presents for a preoperative evaluation.      5/5/2023     8:18 AM   Additional Questions   Roomed by Domeinca SNYDER     Surgical Information:  Surgery/Procedure: Cystoscopy with bulking   Surgery Location: [facility]  Surgeon: Dr Sander Whitt  Surgery Date: 5/15/23  Time of Surgery:   Where patient plans to recover: At home with family  Fax number for surgical facility: Note does not need to be faxed, will be available electronically in Epic.    Assessment & Plan     The proposed surgical procedure is considered LOW risk.    Preop general physical exam: Completed today. No labs or EKG are needed. She will hold her vitamins and NSAIDS one week prior to surgery. Will follow up if having new symptoms.     Female stress incontinence: Long standing history of this, to have a cystoscopy completed.     Essential hypertension: Blood pressure today was 102/72. She continues on hydrochlorothiazide, Losartan, and Norvasc. Stable.     Vitamin D deficiency: hx of this, remains on supplement.     Common wart: finger, frozen with liquid nitrogen, she tolerated this well.        - No identified additional risk factors other than previously addressed    Antiplatelet or Anticoagulation Medication Instructions:   - Patient is on no antiplatelet or anticoagulation medications.    Additional Medication Instructions:  Hold Vitamins and NSAIDS 7 days prior to surgery.    RECOMMENDATION:  APPROVAL GIVEN to proceed with proposed procedure, without further diagnostic evaluation.    Subjective     HPI related to upcoming procedure:         5/5/2023     8:15 AM   Preop Questions   1. Have you ever had a heart attack or stroke? No   2.  Have you ever had surgery on your heart or blood vessels, such as a stent placement, a coronary artery bypass, or surgery on an artery in your head, neck, heart, or legs? No   3. Do you have chest pain with activity? No   4. Do you have a history of  heart failure? No   5. Do you currently have a cold, bronchitis or symptoms of other infection? No   6. Do you have a cough, shortness of breath, or wheezing? No   7. Do you or anyone in your family have previous history of blood clots? YES - Her Dad   8. Do you or does anyone in your family have a serious bleeding problem such as prolonged bleeding following surgeries or cuts? No   9. Have you ever had problems with anemia or been told to take iron pills? No   10. Have you had any abnormal blood loss such as black, tarry or bloody stools, or abnormal vaginal bleeding? No   11. Have you ever had a blood transfusion? No   12. Are you willing to have a blood transfusion if it is medically needed before, during, or after your surgery? Yes   13. Have you or any of your relatives ever had problems with anesthesia? No   14. Do you have sleep apnea, excessive snoring or daytime drowsiness? No   15. Do you have any artifical heart valves or other implanted medical devices like a pacemaker, defibrillator, or continuous glucose monitor? No   16. Do you have artificial joints? YES - Right total knee   17. Are you allergic to latex? No       Health Care Directive:  Patient does not have a Health Care Directive or Living Will: Full Code    Preoperative Review of :   reviewed - no record of controlled substances prescribed.    Review of Systems  Constitutional, neuro, ENT, endocrine, pulmonary, cardiac, gastrointestinal, genitourinary, musculoskeletal, integument and psychiatric systems are negative, except as otherwise noted.    Patient Active Problem List    Diagnosis Date Noted     Stress incontinence of urine 03/08/2023     Priority: Medium     Sensorineural hearing loss of  both ears 2019     Priority: Medium     Tinnitus of both ears 2019     Priority: Medium     Wears hearing aid in both ears 2019     Priority: Medium     Adenomatous colon polyp (2014) 2017     Priority: Medium     Hypertension      Priority: Medium     Created by Conversion  Replacement Utility updated for latest IMO load         Urge And Stress Incontinence      Priority: Medium     Created by Conversion          Past Medical History:   Diagnosis Date     Hypertension      Urge incontinence      Past Surgical History:   Procedure Laterality Date     NO PAST SURGERIES       Current Outpatient Medications   Medication Sig Dispense Refill     amLODIPine (NORVASC) 5 MG tablet TAKE 1 TABLET BY MOUTH EVERY DAY 90 tablet 3     hydrochlorothiazide (HYDRODIURIL) 25 MG tablet TAKE 1 TABLET BY MOUTH EVERY DAY (Patient taking differently: Take 12.5 mg by mouth daily) 90 tablet 0     losartan (COZAAR) 100 MG tablet TAKE 1/2 OF A TABLET (50 MG) BY MOUTH DAILY 45 tablet 3     multivitamin (ONE-DAILY) tablet Take 1 tablet by mouth daily       vit A/vit C/vit E/zinc/copper (PRESERVISION AREDS ORAL) [VIT A/VIT C/VIT E/ZINC/COPPER (PRESERVISION AREDS ORAL)] Take by mouth.         No Known Allergies     Social History     Tobacco Use     Smoking status: Former     Packs/day: 0.25     Years: 40.00     Pack years: 10.00     Types: Cigarettes     Start date: 1970     Quit date: 2015     Years since quittin.3     Smokeless tobacco: Never   Vaping Use     Vaping status: Never Used   Substance Use Topics     Alcohol use: Not Currently     Alcohol/week: 3.0 standard drinks of alcohol     Types: 3 Standard drinks or equivalent per week     Family History   Problem Relation Age of Onset     Cancer Paternal Grandmother         Unsure what kind of cancer.  Age in 40's     Hypertension Mother      Coronary Artery Disease Father      Clotting Disorder Father      Breast Cancer No family hx of      History  "  Drug Use No         Objective     /72 (BP Location: Right arm, Patient Position: Sitting)   Pulse 67   Temp 97.9  F (36.6  C)   Resp 16   Ht 1.638 m (5' 4.5\")   Wt 76.7 kg (169 lb)   SpO2 97%   BMI 28.56 kg/m      Physical Exam    GENERAL APPEARANCE: healthy, alert and no distress     EYES: EOMI, PERRL     HENT: ear canals and TM's normal and nose and mouth without ulcers or lesions     NECK: no adenopathy, no asymmetry, masses, or scars and thyroid normal to palpation     RESP: lungs clear to auscultation - no rales, rhonchi or wheezes     CV: regular rates and rhythm, normal S1 S2, no S3 or S4 and no murmur, click or rub     ABDOMEN:  soft, nontender, no HSM or masses and bowel sounds normal     MS: extremities normal- no gross deformities noted, no evidence of inflammation in joints, FROM in all extremities.     SKIN: common wart, left pointer finger.      NEURO: Normal strength and tone, sensory exam grossly normal, mentation intact and speech normal     PSYCH: mentation appears normal. and affect normal/bright     LYMPHATICS: No cervical adenopathy    Recent Labs   Lab Test 03/08/23  0839 12/17/21  0732   HGB 13.2 11.9    316    143   POTASSIUM 4.3 4.1   CR 1.01* 0.85        Diagnostics:  No labs were ordered during this visit.   No EKG required for low risk surgery (cataract, skin procedure, breast biopsy, etc).  No EKG required, no history of coronary heart disease, significant arrhythmia, peripheral arterial disease or other structural heart disease.    Revised Cardiac Risk Index (RCRI):  The patient has the following serious cardiovascular risks for perioperative complications:   - No serious cardiac risks = 0 points     RCRI Interpretation: 0 points: Class I (very low risk - 0.4% complication rate)           Signed Electronically by: Lissette Villalpando CNP  Copy of this evaluation report is provided to requesting physician.      "

## 2023-05-05 NOTE — PATIENT INSTRUCTIONS
Go see Yamilex Morales CNP at [facility] about your hormones.    Try Sleep 3 a supplement over the counter for now.    No NSAIDS 7 days prior to surgery. Hold Vitamins 7 days prior.    Follow up prior to surgery if having new symptoms.

## 2023-05-15 ENCOUNTER — ANESTHESIA (OUTPATIENT)
Dept: SURGERY | Facility: HOSPITAL | Age: 73
End: 2023-05-15
Payer: COMMERCIAL

## 2023-05-15 ENCOUNTER — HOSPITAL ENCOUNTER (OUTPATIENT)
Facility: HOSPITAL | Age: 73
Discharge: HOME | End: 2023-05-15
Attending: OBSTETRICS & GYNECOLOGY | Admitting: OBSTETRICS & GYNECOLOGY
Payer: COMMERCIAL

## 2023-05-15 ENCOUNTER — ANESTHESIA EVENT (OUTPATIENT)
Dept: SURGERY | Facility: HOSPITAL | Age: 73
End: 2023-05-15
Payer: COMMERCIAL

## 2023-05-15 VITALS
TEMPERATURE: 98.2 F | HEART RATE: 69 BPM | DIASTOLIC BLOOD PRESSURE: 64 MMHG | OXYGEN SATURATION: 95 % | WEIGHT: 167.8 LBS | BODY MASS INDEX: 27.96 KG/M2 | SYSTOLIC BLOOD PRESSURE: 106 MMHG | RESPIRATION RATE: 18 BRPM | HEIGHT: 65 IN

## 2023-05-15 DIAGNOSIS — Z09 S/P GYNECOLOGICAL SURGERY, FOLLOW-UP EXAM: Primary | ICD-10-CM

## 2023-05-15 PROCEDURE — 250N000009 HC RX 250: Performed by: NURSE ANESTHETIST, CERTIFIED REGISTERED

## 2023-05-15 PROCEDURE — 999N000141 HC STATISTIC PRE-PROCEDURE NURSING ASSESSMENT: Performed by: OBSTETRICS & GYNECOLOGY

## 2023-05-15 PROCEDURE — 360N000075 HC SURGERY LEVEL 2, PER MIN: Performed by: OBSTETRICS & GYNECOLOGY

## 2023-05-15 PROCEDURE — 258N000003 HC RX IP 258 OP 636: Performed by: NURSE ANESTHETIST, CERTIFIED REGISTERED

## 2023-05-15 PROCEDURE — 250N000011 HC RX IP 250 OP 636: Performed by: NURSE PRACTITIONER

## 2023-05-15 PROCEDURE — 258N000003 HC RX IP 258 OP 636: Performed by: ANESTHESIOLOGY

## 2023-05-15 PROCEDURE — 370N000017 HC ANESTHESIA TECHNICAL FEE, PER MIN: Performed by: OBSTETRICS & GYNECOLOGY

## 2023-05-15 PROCEDURE — 250N000011 HC RX IP 250 OP 636: Performed by: NURSE ANESTHETIST, CERTIFIED REGISTERED

## 2023-05-15 PROCEDURE — L8606 SYNTHETIC IMPLNT URINARY 1ML: HCPCS | Performed by: OBSTETRICS & GYNECOLOGY

## 2023-05-15 PROCEDURE — 250N000013 HC RX MED GY IP 250 OP 250 PS 637: Performed by: NURSE PRACTITIONER

## 2023-05-15 PROCEDURE — 710N000012 HC RECOVERY PHASE 2, PER MINUTE: Performed by: OBSTETRICS & GYNECOLOGY

## 2023-05-15 PROCEDURE — 250N000009 HC RX 250: Performed by: OBSTETRICS & GYNECOLOGY

## 2023-05-15 PROCEDURE — 272N000001 HC OR GENERAL SUPPLY STERILE: Performed by: OBSTETRICS & GYNECOLOGY

## 2023-05-15 DEVICE — BULKAMID URETHRAL BULKING SYSTEM
Type: IMPLANTABLE DEVICE | Site: BLADDER | Status: FUNCTIONAL
Brand: BULKAMID

## 2023-05-15 RX ORDER — OXYCODONE HYDROCHLORIDE 5 MG/1
5 TABLET ORAL
Status: DISCONTINUED | OUTPATIENT
Start: 2023-05-15 | End: 2023-05-15

## 2023-05-15 RX ORDER — IBUPROFEN 200 MG
1 CAPSULE ORAL DAILY
COMMUNITY

## 2023-05-15 RX ORDER — IBUPROFEN 600 MG/1
600 TABLET, FILM COATED ORAL EVERY 6 HOURS PRN
Qty: 30 TABLET | Refills: 0 | Status: SHIPPED | OUTPATIENT
Start: 2023-05-15

## 2023-05-15 RX ORDER — ONDANSETRON 2 MG/ML
INJECTION INTRAMUSCULAR; INTRAVENOUS PRN
Status: DISCONTINUED | OUTPATIENT
Start: 2023-05-15 | End: 2023-05-15

## 2023-05-15 RX ORDER — GLYCOPYRROLATE 0.2 MG/ML
INJECTION, SOLUTION INTRAMUSCULAR; INTRAVENOUS PRN
Status: DISCONTINUED | OUTPATIENT
Start: 2023-05-15 | End: 2023-05-15

## 2023-05-15 RX ORDER — ACETAMINOPHEN 325 MG/1
975 TABLET ORAL ONCE
Status: DISCONTINUED | OUTPATIENT
Start: 2023-05-15 | End: 2023-05-15 | Stop reason: HOSPADM

## 2023-05-15 RX ORDER — CEFAZOLIN SODIUM/WATER 2 G/20 ML
2 SYRINGE (ML) INTRAVENOUS SEE ADMIN INSTRUCTIONS
Status: DISCONTINUED | OUTPATIENT
Start: 2023-05-15 | End: 2023-05-15 | Stop reason: HOSPADM

## 2023-05-15 RX ORDER — DEXAMETHASONE SODIUM PHOSPHATE 10 MG/ML
INJECTION, SOLUTION INTRAMUSCULAR; INTRAVENOUS PRN
Status: DISCONTINUED | OUTPATIENT
Start: 2023-05-15 | End: 2023-05-15

## 2023-05-15 RX ORDER — IBUPROFEN 200 MG
600 TABLET ORAL ONCE
Status: DISCONTINUED | OUTPATIENT
Start: 2023-05-15 | End: 2023-05-15 | Stop reason: HOSPADM

## 2023-05-15 RX ORDER — CEPHALEXIN 500 MG/1
500 CAPSULE ORAL 2 TIMES DAILY
Qty: 14 CAPSULE | Refills: 0 | Status: SHIPPED | OUTPATIENT
Start: 2023-05-15 | End: 2023-05-22

## 2023-05-15 RX ORDER — OXYCODONE HYDROCHLORIDE 5 MG/1
5-10 TABLET ORAL EVERY 4 HOURS PRN
Qty: 6 TABLET | Refills: 0 | Status: SHIPPED | OUTPATIENT
Start: 2023-05-15 | End: 2023-07-03

## 2023-05-15 RX ORDER — OXYCODONE HYDROCHLORIDE 5 MG/1
10 TABLET ORAL
Status: DISCONTINUED | OUTPATIENT
Start: 2023-05-15 | End: 2023-05-15 | Stop reason: HOSPADM

## 2023-05-15 RX ORDER — LIDOCAINE HYDROCHLORIDE 10 MG/ML
INJECTION, SOLUTION INFILTRATION; PERINEURAL PRN
Status: DISCONTINUED | OUTPATIENT
Start: 2023-05-15 | End: 2023-05-15

## 2023-05-15 RX ORDER — ACETAMINOPHEN 325 MG/1
975 TABLET ORAL ONCE
Status: COMPLETED | OUTPATIENT
Start: 2023-05-15 | End: 2023-05-15

## 2023-05-15 RX ORDER — SODIUM CHLORIDE, SODIUM LACTATE, POTASSIUM CHLORIDE, CALCIUM CHLORIDE 600; 310; 30; 20 MG/100ML; MG/100ML; MG/100ML; MG/100ML
INJECTION, SOLUTION INTRAVENOUS CONTINUOUS PRN
Status: DISCONTINUED | OUTPATIENT
Start: 2023-05-15 | End: 2023-05-15

## 2023-05-15 RX ORDER — PROPOFOL 10 MG/ML
INJECTION, EMULSION INTRAVENOUS CONTINUOUS PRN
Status: DISCONTINUED | OUTPATIENT
Start: 2023-05-15 | End: 2023-05-15

## 2023-05-15 RX ORDER — OXYCODONE HYDROCHLORIDE 5 MG/1
5 TABLET ORAL
Status: DISCONTINUED | OUTPATIENT
Start: 2023-05-15 | End: 2023-05-15 | Stop reason: HOSPADM

## 2023-05-15 RX ORDER — CEFAZOLIN SODIUM/WATER 2 G/20 ML
2 SYRINGE (ML) INTRAVENOUS
Status: COMPLETED | OUTPATIENT
Start: 2023-05-15 | End: 2023-05-15

## 2023-05-15 RX ORDER — SODIUM CHLORIDE, SODIUM LACTATE, POTASSIUM CHLORIDE, CALCIUM CHLORIDE 600; 310; 30; 20 MG/100ML; MG/100ML; MG/100ML; MG/100ML
INJECTION, SOLUTION INTRAVENOUS CONTINUOUS
Status: DISCONTINUED | OUTPATIENT
Start: 2023-05-15 | End: 2023-05-15 | Stop reason: HOSPADM

## 2023-05-15 RX ORDER — LIDOCAINE 40 MG/G
CREAM TOPICAL
Status: DISCONTINUED | OUTPATIENT
Start: 2023-05-15 | End: 2023-05-15 | Stop reason: HOSPADM

## 2023-05-15 RX ORDER — FENTANYL CITRATE 50 UG/ML
INJECTION, SOLUTION INTRAMUSCULAR; INTRAVENOUS PRN
Status: DISCONTINUED | OUTPATIENT
Start: 2023-05-15 | End: 2023-05-15

## 2023-05-15 RX ADMIN — FENTANYL CITRATE 25 MCG: 50 INJECTION, SOLUTION INTRAMUSCULAR; INTRAVENOUS at 12:39

## 2023-05-15 RX ADMIN — SODIUM CHLORIDE, POTASSIUM CHLORIDE, SODIUM LACTATE AND CALCIUM CHLORIDE: 600; 310; 30; 20 INJECTION, SOLUTION INTRAVENOUS at 12:22

## 2023-05-15 RX ADMIN — LIDOCAINE HYDROCHLORIDE 5 ML: 10 INJECTION, SOLUTION INFILTRATION; PERINEURAL at 12:24

## 2023-05-15 RX ADMIN — MIDAZOLAM 2 MG: 1 INJECTION INTRAMUSCULAR; INTRAVENOUS at 12:21

## 2023-05-15 RX ADMIN — GLYCOPYRROLATE 0.2 MG: 0.2 INJECTION INTRAMUSCULAR; INTRAVENOUS at 12:42

## 2023-05-15 RX ADMIN — ONDANSETRON 4 MG: 2 INJECTION INTRAMUSCULAR; INTRAVENOUS at 12:26

## 2023-05-15 RX ADMIN — Medication 2 G: at 12:29

## 2023-05-15 RX ADMIN — FENTANYL CITRATE 25 MCG: 50 INJECTION, SOLUTION INTRAMUSCULAR; INTRAVENOUS at 12:38

## 2023-05-15 RX ADMIN — PROPOFOL 150 MCG/KG/MIN: 10 INJECTION, EMULSION INTRAVENOUS at 12:24

## 2023-05-15 RX ADMIN — DEXAMETHASONE SODIUM PHOSPHATE 4 MG: 10 INJECTION, SOLUTION INTRAMUSCULAR; INTRAVENOUS at 12:35

## 2023-05-15 RX ADMIN — PHENYLEPHRINE HYDROCHLORIDE 50 MCG: 10 INJECTION INTRAVENOUS at 12:49

## 2023-05-15 RX ADMIN — ACETAMINOPHEN 975 MG: 325 TABLET ORAL at 11:13

## 2023-05-15 RX ADMIN — SODIUM CHLORIDE, POTASSIUM CHLORIDE, SODIUM LACTATE AND CALCIUM CHLORIDE: 600; 310; 30; 20 INJECTION, SOLUTION INTRAVENOUS at 11:23

## 2023-05-15 ASSESSMENT — ACTIVITIES OF DAILY LIVING (ADL)
ADLS_ACUITY_SCORE: 26
ADLS_ACUITY_SCORE: 35

## 2023-05-15 NOTE — INTERVAL H&P NOTE
"I have reviewed the surgical (or preoperative) H&P that is linked to this encounter, and examined the patient. There are no significant changes    Clinical Conditions Present on Arrival:  Clinically Significant Risk Factors Present on Admission                  # Overweight: Estimated body mass index is 27.92 kg/m  as calculated from the following:    Height as of this encounter: 1.651 m (5' 5\").    Weight as of this encounter: 76.1 kg (167 lb 12.8 oz).       "

## 2023-05-15 NOTE — ANESTHESIA POSTPROCEDURE EVALUATION
Patient: Catarina Mendenhall    Procedure: Procedure(s):  CYSTOSCOPY WITH MID URETHRAL BULKING WITH BULKAMID       Anesthesia Type:  MAC    Note:  Disposition: Outpatient   Postop Pain Control: Uneventful            Sign Out: Well controlled pain   PONV: No   Neuro/Psych: Uneventful            Sign Out: Acceptable/Baseline neuro status   Airway/Respiratory: Uneventful            Sign Out: Acceptable/Baseline resp. status   CV/Hemodynamics: Uneventful            Sign Out: Acceptable CV status; No obvious hypovolemia; No obvious fluid overload   Other NRE: NONE   DID A NON-ROUTINE EVENT OCCUR?            Last vitals:  Vitals Value Taken Time   /63 05/15/23 1301   Temp 36.4  C (97.6  F) 05/15/23 1301   Pulse 79 05/15/23 1306   Resp 16 05/15/23 1301   SpO2 95 % 05/15/23 1306   Vitals shown include unvalidated device data.    Electronically Signed By: Rafael Montoya MD  May 15, 2023  1:25 PM

## 2023-05-15 NOTE — ANESTHESIA PREPROCEDURE EVALUATION
Anesthesia Pre-Procedure Evaluation    Patient: Catarina Mendenhall   MRN: [id number] : 1950        Procedure : Procedure(s):  CYSTOSCOPY WITH MID URETHRAL BULKING WITH BULKAMID          Past Medical History:   Diagnosis Date     Adenomatous colon polyp      Common wart      Female stress incontinence      Hypertension      Mixed conductive and sensorineural hearing loss of both ears      Tinnitus of both ears      Urge incontinence      Vitamin D deficiency       Past Surgical History:   Procedure Laterality Date     REPLACEMENT TOTAL KNEE Right       No Known Allergies   Social History     Tobacco Use     Smoking status: Former     Packs/day: 0.25     Years: 40.00     Pack years: 10.00     Types: Cigarettes     Start date: 1970     Quit date: 2015     Years since quittin.4     Smokeless tobacco: Never   Vaping Use     Vaping status: Never Used   Substance Use Topics     Alcohol use: Yes     Alcohol/week: 3.0 standard drinks of alcohol     Types: 3 Standard drinks or equivalent per week     Comment: 4 drinks per week      Wt Readings from Last 1 Encounters:   05/15/23 76.1 kg (167 lb 12.8 oz)        Anesthesia Evaluation   Pt has had prior anesthetic.     No history of anesthetic complications       ROS/MED HX  ENT/Pulmonary:  - neg pulmonary ROS     Neurologic:  - neg neurologic ROS     Cardiovascular:     (+) hypertension-----    METS/Exercise Tolerance: >4 METS    Hematologic:  - neg hematologic  ROS     Musculoskeletal:  - neg musculoskeletal ROS     GI/Hepatic:  - neg GI/hepatic ROS     Renal/Genitourinary:  - neg Renal ROS     Endo:  - neg endo ROS     Psychiatric/Substance Use:  - neg psychiatric ROS     Infectious Disease:  - neg infectious disease ROS     Malignancy:  - neg malignancy ROS     Other:  - neg other ROS          Physical Exam    Airway  airway exam normal      Mallampati: II   TM distance: > 3 FB   Neck ROM: full   Mouth opening: > 3 cm    Respiratory Devices and Support          Dental           Cardiovascular   cardiovascular exam normal          Pulmonary   pulmonary exam normal                OUTSIDE LABS:  CBC:   Lab Results   Component Value Date    WBC 5.9 03/08/2023    WBC 6.3 12/17/2021    HGB 13.2 03/08/2023    HGB 11.9 12/17/2021    HCT 40.5 03/08/2023    HCT 36.6 12/17/2021     03/08/2023     12/17/2021     BMP:   Lab Results   Component Value Date     03/08/2023     12/17/2021    POTASSIUM 4.3 03/08/2023    POTASSIUM 4.1 12/17/2021    CHLORIDE 105 03/08/2023    CHLORIDE 107 12/17/2021    CO2 24 03/08/2023    CO2 28 12/17/2021    BUN 21.1 03/08/2023    BUN 20 12/17/2021    CR 1.01 (H) 03/08/2023    CR 0.85 12/17/2021    GLC 99 03/08/2023     12/17/2021     COAGS:   Lab Results   Component Value Date    INR 0.95 01/02/2020     POC: No results found for: BGM, HCG, HCGS  HEPATIC:   Lab Results   Component Value Date    ALBUMIN 4.3 03/08/2023    PROTTOTAL 6.8 03/08/2023    ALT 20 03/08/2023    AST 22 03/08/2023    ALKPHOS 89 03/08/2023    BILITOTAL 0.5 03/08/2023     OTHER:   Lab Results   Component Value Date    A1C 5.7 01/05/2018    LELIA 9.4 03/08/2023    TSH 1.09 12/17/2021       Anesthesia Plan    ASA Status:  2   NPO Status:  NPO Appropriate    Anesthesia Type: MAC.     - Reason for MAC: straight local not clinically adequate   Induction: Propofol.   Maintenance: TIVA.        Consents    Anesthesia Plan(s) and associated risks, benefits, and realistic alternatives discussed. Questions answered and patient/representative(s) expressed understanding.    - Discussed:     - Discussed with:  Patient      - Extended Intubation/Ventilatory Support Discussed: No.      - Patient is DNR/DNI Status: No    Use of blood products discussed: No .     Postoperative Care    Pain management: IV analgesics, Multi-modal analgesia, Oral pain medications.   PONV prophylaxis: Ondansetron (or other 5HT-3)     Comments:                Rafael Montoya MD

## 2023-05-15 NOTE — ANESTHESIA CARE TRANSFER NOTE
Patient: Catarina Mendenhall    Procedure: Procedure(s):  CYSTOSCOPY WITH MID URETHRAL BULKING WITH BULKAMID       Diagnosis: Stress incontinence in female [N39.3]  Diagnosis Additional Information: No value filed.    Anesthesia Type:   MAC     Note:    Oropharynx: oropharynx clear of all foreign objects and spontaneously breathing  Level of Consciousness: awake  Oxygen Supplementation: room air    Independent Airway: airway patency satisfactory and stable  Dentition: dentition unchanged  Vital Signs Stable: post-procedure vital signs reviewed and stable  Report to RN Given: handoff report given  Patient transferred to: Phase II    Handoff Report: Identifed the Patient, Identified the Reponsible Provider, Reviewed the pertinent medical history, Discussed the surgical course, Reviewed Intra-OP anesthesia mangement and issues during anesthesia, Set expectations for post-procedure period and Allowed opportunity for questions and acknowledgement of understanding      Vitals:  Vitals Value Taken Time   /63 05/15/23 1301   Temp 36.4  C (97.6  F) 05/15/23 1301   Pulse 72 05/15/23 1302   Resp 16 05/15/23 1301   SpO2 94 % 05/15/23 1302   Vitals shown include unvalidated device data.    Electronically Signed By: JAKE Cuba CRNA  May 15, 2023  1:04 PM

## 2023-05-18 NOTE — OP NOTE
Procedure Date: 05/15/2023    PREOPERATIVE DIAGNOSES:   1.  Stress incontinence.  2.  Intrinsic sphincteric deficiency.    POSTOPERATIVE DIAGNOSES:  1.  Stress incontinence.  2.  Intrinsic sphincteric deficiency.    PROCEDURE:  Urethral bulking with Bulkamid.    SURGEON:  Sander Whitt MD    FINDINGS:  Normal urethra.    DESCRIPTION OF THE OPERATIVE PROCEDURE:  After assuring informed consent, the patient was taken to the operating room.  She was prepped and draped in the usual manner, placed in dorsal lithotomy position with feet in stirrups.  The patient was positioned by me, ensuring there was no excess flexion of the hips or the knees.  At this point, the urethroscope was inserted.  The needle was advanced within the bladder to the 2 cm gosia.  Needle tip was then pulled back where the tip was aligned at the urethral meatus so that the trocar was 2 cm from the urethral meatus.  I then injected by advancing the needle 1 cm in with the bevel towards the lumen at the 5 o'clock position, ensuring that there was no blanching of the vessels.  The same was done at the 7 o'clock position and then the 2 o'clock position.  This concluded the procedure.  Instruments, needle, and sponge were correct x2.    ESTIMATED BLOOD LOSS:  Minimal.    COMPLICATIONS:  None.    DISPOSITION:  The patient to recovery room in stable condition.    Sander Whitt MD        D: 2023   T: 2023   MT: [email or web address]    Name:     CRYSTAL MENENDEZ  MRN:      [id number]        Account:        [id number]   :      1950           Procedure Date: 05/15/2023     Document: [id number]

## 2023-05-23 ENCOUNTER — TRANSFERRED RECORDS (OUTPATIENT)
Dept: HEALTH INFORMATION MANAGEMENT | Facility: CLINIC | Age: 73
End: 2023-05-23
Payer: COMMERCIAL

## 2023-05-25 NOTE — OP NOTE
Procedure Date: 05/15/2023    PREOPERATIVE DIAGNOSIS:    1.  Intrinsic sphincteric deficiency.  2.  Stress urinary incontinence.    POSTOPERATIVE DIAGNOSIS:  1.  Intrinsic sphincteric deficiency.  2.  Stress urinary incontinence.    PROCEDURE:  Urethral bulking with Bulkamid.    SURGEON:  Sander Whitt MD    FINDINGS:  Consistent with preoperative findings.    DESCRIPTION OF THE OPERATIVE PROCEDURE:  After assuring informed consent, the patient was taken to the operating room.  She was prepped and draped in the usual manner, placed in dorsal lithotomy position, feet in stirrups.  The patient was positioned by me, ensuring there was no excess flexion of the hips or the knees.  At this point, cystoscopy was performed.  The injection needle was placed 2 cm within the bladder based upon markings.  The needle and the scope were then pulled until the tip of the needle was at the level of the urethral meatus.  It was then injected 1 cm at the 5 o'clock position.  Urethral bulking was performed to the midline.  Same was done at the 7 o'clock and 2 o'clock positions.  This concluded the procedure.  The patient was sent to the recovery room in stable condition.    Sander Whitt MD        D: 2023   T: 2023   MT: adam    Name:     CRYSTAL MENENDEZ  MRN:      [id number]        Account:        [id number]   :      1950           Procedure Date: 05/15/2023     Document: [id number]

## 2023-07-03 ENCOUNTER — OFFICE VISIT (OUTPATIENT)
Dept: INTERNAL MEDICINE | Facility: CLINIC | Age: 73
End: 2023-07-03
Payer: COMMERCIAL

## 2023-07-03 VITALS
HEART RATE: 75 BPM | TEMPERATURE: 98.2 F | BODY MASS INDEX: 27.66 KG/M2 | WEIGHT: 166 LBS | OXYGEN SATURATION: 97 % | SYSTOLIC BLOOD PRESSURE: 98 MMHG | HEIGHT: 65 IN | DIASTOLIC BLOOD PRESSURE: 62 MMHG | RESPIRATION RATE: 16 BRPM

## 2023-07-03 DIAGNOSIS — R06.02 SOB (SHORTNESS OF BREATH): ICD-10-CM

## 2023-07-03 DIAGNOSIS — B07.8 COMMON WART: ICD-10-CM

## 2023-07-03 DIAGNOSIS — I10 ESSENTIAL HYPERTENSION: ICD-10-CM

## 2023-07-03 PROCEDURE — 99213 OFFICE O/P EST LOW 20 MIN: CPT | Performed by: NURSE PRACTITIONER

## 2023-07-03 RX ORDER — HYDROCHLOROTHIAZIDE 12.5 MG/1
12.5 TABLET ORAL DAILY
Qty: 90 TABLET | Refills: 3
Start: 2023-07-03

## 2023-07-03 NOTE — PROGRESS NOTES
"  Assessment & Plan     Common wart: left pointer finger, right index finger. Frozen with liquid nitrogen, she tolerated this well. She will debride at home and follow up again in one month. Discussed covering them with duck tape to help decrease airflow as well.     Essential hypertension: Blood pressure today was well controlled at 98/62. She continues on Norvasc, hydrochlorothiazide, and Losartan. Stable.   - hydrochlorothiazide (HYDRODIURIL) 12.5 MG tablet  Dispense: 90 tablet; Refill: 3    SOB (shortness of breath): When walking outside, up hills. No other symptoms, no chest pain, chest pressure. She will monitor this, and update provider if this persists or worsens.      BMI:   Estimated body mass index is 27.62 kg/m  as calculated from the following:    Height as of this encounter: 1.651 m (5' 5\").    Weight as of this encounter: 75.3 kg (166 lb).     RIRI Alexander [facility]    Natalie Elmore is a 72 year old, presenting for the following health issues:  Wart (Left index finger, right ring finger)        7/3/2023    10:09 AM   Additional Questions   Roomed by      History of Present Illness       Reason for visit:  Wart    She eats 2-3 servings of fruits and vegetables daily.She consumes 0 sweetened beverage(s) daily.She exercises with enough effort to increase her heart rate 30 to 60 minutes per day.  She exercises with enough effort to increase her heart rate 5 days per week.   She is taking medications regularly.     The patient presents today for follow up of her finger warts. She needs them frozen again.    Discussed that we can keep working on these monthly, or she could go see her dermatologist to have them debrided and treated.     She also reports that when she is out walking at times, she has some shortness of breath when going up the hills. No chest pain, chest pressure, or cough. She reports that she thinks she is out of shape, and that is " "what is causing this. She will watch this closely and update provider if this continues or worsens.     Review of Systems   Constitutional, HEENT, cardiovascular, pulmonary, GI, , musculoskeletal, neuro, skin, endocrine and psych systems are negative, except as otherwise noted.      Objective    BP 98/62 (BP Location: Right arm, Patient Position: Sitting, Cuff Size: Adult Regular)   Pulse 75   Temp 98.2  F (36.8  C) (Oral)   Resp 16   Ht 1.651 m (5' 5\")   Wt 75.3 kg (166 lb)   SpO2 97%   BMI 27.62 kg/m    Body mass index is 27.62 kg/m .  Physical Exam   GENERAL: healthy, alert and no distress  EYES: Eyes grossly normal to inspection, PERRL and conjunctivae and sclerae normal  RESP: lungs clear to auscultation - no rales, rhonchi or wheezes  CV: regular rate and rhythm, normal S1 S2, no S3 or S4, no murmur, click or rub, no peripheral edema and peripheral pulses strong  MS: no gross musculoskeletal defects noted, no edema  SKIN: wart - fingers; left index finger, and right ring finger, 0.5cm round area.  NEURO: Normal strength and tone, mentation intact and speech normal  PSYCH: mentation appears normal, affect normal/bright        "

## 2023-07-03 NOTE — PATIENT INSTRUCTIONS
Cover the warts with duck tape when you can to help stop the airflow to them.    Peel the layers off, and follow up in a month.

## 2023-08-07 ENCOUNTER — OFFICE VISIT (OUTPATIENT)
Dept: INTERNAL MEDICINE | Facility: CLINIC | Age: 73
End: 2023-08-07
Payer: COMMERCIAL

## 2023-08-07 VITALS
RESPIRATION RATE: 16 BRPM | TEMPERATURE: 98.1 F | BODY MASS INDEX: 27.66 KG/M2 | HEIGHT: 65 IN | OXYGEN SATURATION: 98 % | WEIGHT: 166 LBS | DIASTOLIC BLOOD PRESSURE: 78 MMHG | HEART RATE: 63 BPM | SYSTOLIC BLOOD PRESSURE: 112 MMHG

## 2023-08-07 DIAGNOSIS — B07.8 COMMON WART: Primary | ICD-10-CM

## 2023-08-07 PROCEDURE — 99213 OFFICE O/P EST LOW 20 MIN: CPT | Performed by: NURSE PRACTITIONER

## 2023-08-07 NOTE — PROGRESS NOTES
"  Assessment & Plan     Common wart: Debrided right finger, used liquid nitrogen to further freeze the warts on both fingers. She tolerated this well. Skin should slough off in the next couple of weeks. Follow up if warts return.        BMI:   Estimated body mass index is 27.62 kg/m  as calculated from the following:    Height as of this encounter: 1.651 m (5' 5\").    Weight as of this encounter: 75.3 kg (166 lb).   Weight management plan: Discussed healthy diet and exercise guidelines      Lissette Villalpando [facility]    Natalie Elmore is a 72 year old, presenting for the following health issues:  Follow Up (1 month follow up- wart on finger)        8/7/2023    10:41 AM   Additional Questions   Roomed by Domenica SNYDER       History of Present Illness       Reason for visit:  Wart removal    She eats 2-3 servings of fruits and vegetables daily.She consumes 0 sweetened beverage(s) daily.She exercises with enough effort to increase her heart rate 30 to 60 minutes per day.  She exercises with enough effort to increase her heart rate 4 days per week.   She is taking medications regularly.     The patient presents today for follow up of the warts on her fingers.    She reports that the right hand, index finger, puffed up, and then the area turned black. She was unable to get the dead tissue removed.    Left pointer finger has not changed.    She denies any new concerns today.    Review of Systems   Constitutional, HEENT, cardiovascular, pulmonary, GI, , musculoskeletal, neuro, skin, endocrine and psych systems are negative, except as otherwise noted.      Objective    /78 (BP Location: Right arm, Patient Position: Sitting)   Pulse 63   Temp 98.1  F (36.7  C)   Resp 16   Ht 1.651 m (5' 5\")   Wt 75.3 kg (166 lb)   SpO2 98%   BMI 27.62 kg/m    Body mass index is 27.62 kg/m .  Physical Exam  Vitals and nursing note reviewed.   Constitutional:       Appearance: Normal " appearance. She is normal weight.   HENT:      Head: Normocephalic and atraumatic.   Skin:     General: Skin is warm and dry.      Capillary Refill: Capillary refill takes less than 2 seconds.      Findings: Lesion present.          Neurological:      General: No focal deficit present.      Mental Status: She is alert and oriented to person, place, and time. Mental status is at baseline.   Psychiatric:         Mood and Affect: Mood normal.         Behavior: Behavior normal.         Thought Content: Thought content normal.         Judgment: Judgment normal.

## 2023-09-06 ENCOUNTER — TRANSFERRED RECORDS (OUTPATIENT)
Dept: HEALTH INFORMATION MANAGEMENT | Facility: CLINIC | Age: 73
End: 2023-09-06
Payer: COMMERCIAL

## 2023-10-25 ENCOUNTER — HOSPITAL ENCOUNTER (OUTPATIENT)
Dept: MAMMOGRAPHY | Facility: CLINIC | Age: 73
Discharge: HOME | End: 2023-10-25
Attending: NURSE PRACTITIONER | Admitting: NURSE PRACTITIONER
Payer: COMMERCIAL

## 2023-10-25 ENCOUNTER — E-VISIT (OUTPATIENT)
Dept: URGENT CARE | Facility: CLINIC | Age: 73
End: 2023-10-25
Payer: COMMERCIAL

## 2023-10-25 DIAGNOSIS — R30.0 DIFFICULT OR PAINFUL URINATION: ICD-10-CM

## 2023-10-25 DIAGNOSIS — Z12.31 VISIT FOR SCREENING MAMMOGRAM: ICD-10-CM

## 2023-10-25 DIAGNOSIS — N30.00 ACUTE CYSTITIS WITHOUT HEMATURIA: Primary | ICD-10-CM

## 2023-10-25 PROCEDURE — 99421 OL DIG E/M SVC 5-10 MIN: CPT | Performed by: PHYSICIAN ASSISTANT

## 2023-10-25 PROCEDURE — 77067 SCR MAMMO BI INCL CAD: CPT

## 2023-10-25 NOTE — PATIENT INSTRUCTIONS
Dear Catarina Mendenhall,     After reviewing your responses, I would like you to come in for a urine test to make sure we treat you correctly. This urine test is to evaluate you for a possible urinary tract infection, and should be scheduled for today or tomorrow. Schedule a Lab Only appointment here.     Lab appointments are not available at most locations on the weekends. If no Lab Only appointment is available, you should be seen in any of our convenient Walk-in or Urgent Care Centers, which can be found on our website here.     You will receive instructions with your results in [facility] once they are available.     If your symptoms worsen, you develop pain in your back or stomach, develop fevers, or are not improving in 5 days, please contact your primary care provider for an appointment or visit a Walk-in or Urgent Care Center to be seen.     Thanks again for choosing us as your health care partner,     Suzanne Miramontes PA-C

## 2023-10-26 ENCOUNTER — LAB (OUTPATIENT)
Dept: LAB | Facility: CLINIC | Age: 73
End: 2023-10-26
Payer: COMMERCIAL

## 2023-10-26 DIAGNOSIS — R30.0 DIFFICULT OR PAINFUL URINATION: ICD-10-CM

## 2023-10-26 LAB
ALBUMIN UR-MCNC: NEGATIVE MG/DL
APPEARANCE UR: CLEAR
BACTERIA #/AREA URNS HPF: ABNORMAL /HPF
BILIRUB UR QL STRIP: NEGATIVE
COLOR UR AUTO: YELLOW
GLUCOSE UR STRIP-MCNC: NEGATIVE MG/DL
HGB UR QL STRIP: NEGATIVE
KETONES UR STRIP-MCNC: NEGATIVE MG/DL
LEUKOCYTE ESTERASE UR QL STRIP: ABNORMAL
NITRATE UR QL: NEGATIVE
PH UR STRIP: 6.5 [PH] (ref 5–8)
RBC #/AREA URNS AUTO: ABNORMAL /HPF
SP GR UR STRIP: 1.02 (ref 1–1.03)
SQUAMOUS #/AREA URNS AUTO: ABNORMAL /LPF
UROBILINOGEN UR STRIP-ACNC: 0.2 E.U./DL
WBC #/AREA URNS AUTO: ABNORMAL /HPF

## 2023-10-26 PROCEDURE — 87086 URINE CULTURE/COLONY COUNT: CPT

## 2023-10-26 PROCEDURE — 87186 SC STD MICRODIL/AGAR DIL: CPT

## 2023-10-26 PROCEDURE — 81001 URINALYSIS AUTO W/SCOPE: CPT

## 2023-10-26 RX ORDER — CEPHALEXIN 500 MG/1
500 CAPSULE ORAL 2 TIMES DAILY
Qty: 10 CAPSULE | Refills: 0 | Status: SHIPPED | OUTPATIENT
Start: 2023-10-26 | End: 2023-11-01

## 2023-10-27 LAB — BACTERIA UR CULT: ABNORMAL

## 2023-11-01 ENCOUNTER — OFFICE VISIT (OUTPATIENT)
Dept: INTERNAL MEDICINE | Facility: CLINIC | Age: 73
End: 2023-11-01
Payer: COMMERCIAL

## 2023-11-01 VITALS
TEMPERATURE: 98.2 F | OXYGEN SATURATION: 95 % | BODY MASS INDEX: 27.66 KG/M2 | WEIGHT: 166 LBS | RESPIRATION RATE: 16 BRPM | DIASTOLIC BLOOD PRESSURE: 80 MMHG | SYSTOLIC BLOOD PRESSURE: 108 MMHG | HEIGHT: 65 IN | HEART RATE: 69 BPM

## 2023-11-01 DIAGNOSIS — R30.0 DYSURIA: Primary | ICD-10-CM

## 2023-11-01 DIAGNOSIS — B07.8 COMMON WART: ICD-10-CM

## 2023-11-01 PROBLEM — N39.3 STRESS INCONTINENCE OF URINE: Status: RESOLVED | Noted: 2023-03-08 | Resolved: 2023-11-01

## 2023-11-01 LAB
ALBUMIN UR-MCNC: NEGATIVE MG/DL
APPEARANCE UR: CLEAR
BILIRUB UR QL STRIP: NEGATIVE
COLOR UR AUTO: YELLOW
GLUCOSE UR STRIP-MCNC: NEGATIVE MG/DL
HGB UR QL STRIP: ABNORMAL
KETONES UR STRIP-MCNC: ABNORMAL MG/DL
LEUKOCYTE ESTERASE UR QL STRIP: ABNORMAL
NITRATE UR QL: NEGATIVE
PH UR STRIP: 5.5 [PH] (ref 5–8)
RBC #/AREA URNS AUTO: ABNORMAL /HPF
SP GR UR STRIP: >=1.03 (ref 1–1.03)
SQUAMOUS #/AREA URNS AUTO: ABNORMAL /LPF
UROBILINOGEN UR STRIP-ACNC: 0.2 E.U./DL
WBC #/AREA URNS AUTO: ABNORMAL /HPF

## 2023-11-01 PROCEDURE — G0008 ADMIN INFLUENZA VIRUS VAC: HCPCS | Performed by: NURSE PRACTITIONER

## 2023-11-01 PROCEDURE — 81001 URINALYSIS AUTO W/SCOPE: CPT | Performed by: NURSE PRACTITIONER

## 2023-11-01 PROCEDURE — 90662 IIV NO PRSV INCREASED AG IM: CPT | Performed by: NURSE PRACTITIONER

## 2023-11-01 PROCEDURE — 17110 DESTRUCTION B9 LES UP TO 14: CPT | Performed by: NURSE PRACTITIONER

## 2023-11-01 PROCEDURE — 99213 OFFICE O/P EST LOW 20 MIN: CPT | Mod: 25 | Performed by: NURSE PRACTITIONER

## 2023-11-01 RX ORDER — RESPIRATORY SYNCYTIAL VIRUS VACCINE 120MCG/0.5
0.5 KIT INTRAMUSCULAR ONCE
Qty: 1 EACH | Refills: 0 | Status: CANCELLED | OUTPATIENT
Start: 2023-11-01 | End: 2023-11-01

## 2023-11-01 NOTE — PROGRESS NOTES
Assessment & Plan     Dysuria: Pelvic fullness, just finished cephalexin, urine culture from 10/26/23 showed 10-50,000 e coli. Cephalexin was sensitive. Will recheck a urine today. Will not treat unless culture grows bacteria.   - UA Macroscopic with reflex to Microscopic and Culture - Lab Collect  - UA Microscopic with Reflex to Culture    Common wart: Right ring finger, frozen today without difficulty. She will follow up with dermatology.     Lissette Villalpando CNP  [facility]   Kaden is a 73 year old, presenting for the following health issues:  Wart (Right 4th finger)        11/1/2023     7:03 AM   Additional Questions   Roomed by        History of Present Illness       Reason for visit:  Wart    She eats 2-3 servings of fruits and vegetables daily.She consumes 0 sweetened beverage(s) daily.She exercises with enough effort to increase her heart rate 30 to 60 minutes per day.  She exercises with enough effort to increase her heart rate 4 days per week.   She is taking medications regularly.     The patient presents today to have her wart frozen again from her right ring finger. The top layer has sloughed off, but it remains there. Her other warts have resolved. Discussed that she should likely see a dermatologist so they can take the top layers of skin off and reach the root. She will do this.    She recently was treated for a UTI with cephalexin, finished her antibiotics yesterday. She did not have typical symptoms this time. She reports feeling some lower pelvic fullness. She started feeling this again yesterday. She denies any vaginal discomfort, itching or discharge.     Review of Systems   Constitutional, HEENT, cardiovascular, pulmonary, GI, , musculoskeletal, neuro, skin, endocrine and psych systems are negative, except as otherwise noted.      Objective    /80 (BP Location: Right arm, Patient Position: Sitting, Cuff Size: Adult Regular)   Pulse  "69   Temp 98.2  F (36.8  C) (Oral)   Resp 16   Ht 1.651 m (5' 5\")   Wt 75.3 kg (166 lb)   LMP  (LMP Unknown)   SpO2 95%   Breastfeeding No   BMI 27.62 kg/m    Body mass index is 27.62 kg/m .  Physical Exam  Vitals and nursing note reviewed.   Constitutional:       Appearance: Normal appearance. She is normal weight.   HENT:      Head: Normocephalic and atraumatic.   Eyes:      General: No scleral icterus.        Right eye: No discharge.         Left eye: No discharge.      Conjunctiva/sclera: Conjunctivae normal.   Pulmonary:      Comments: Equal chest rise and fall, able to talk in full sentences without shortness of breath  Skin:     General: Skin is warm and dry.      Capillary Refill: Capillary refill takes less than 2 seconds.      Findings: Lesion present.          Neurological:      General: No focal deficit present.      Mental Status: She is alert and oriented to person, place, and time. Mental status is at baseline.   Psychiatric:         Mood and Affect: Mood normal.         Behavior: Behavior normal.         Thought Content: Thought content normal.         Judgment: Judgment normal.                "

## 2023-11-01 NOTE — PATIENT INSTRUCTIONS
We gave you the Flu shot today.    Get your COVID shot when you are 3 months out from COVID.    RSV shot at your local pharmacy.    Urine is processing, I will update you once the culture results are back.     See Derm for the wart.    Follow up as needed.

## 2023-12-06 ENCOUNTER — PATIENT OUTREACH (OUTPATIENT)
Dept: GASTROENTEROLOGY | Facility: CLINIC | Age: 73
End: 2023-12-06
Payer: COMMERCIAL

## 2024-01-23 DIAGNOSIS — I10 ESSENTIAL HYPERTENSION: ICD-10-CM

## 2024-01-24 RX ORDER — AMLODIPINE BESYLATE 5 MG/1
TABLET ORAL
Qty: 90 TABLET | Refills: 3 | Status: SHIPPED | OUTPATIENT
Start: 2024-01-24

## 2024-02-07 ENCOUNTER — PATIENT OUTREACH (OUTPATIENT)
Dept: CARE COORDINATION | Facility: CLINIC | Age: 74
End: 2024-02-07
Payer: COMMERCIAL

## 2024-02-07 ENCOUNTER — TRANSFERRED RECORDS (OUTPATIENT)
Dept: HEALTH INFORMATION MANAGEMENT | Facility: CLINIC | Age: 74
End: 2024-02-07
Payer: COMMERCIAL

## 2024-02-11 DIAGNOSIS — I10 ESSENTIAL HYPERTENSION: ICD-10-CM

## 2024-02-12 RX ORDER — HYDROCHLOROTHIAZIDE 25 MG/1
12.5 TABLET ORAL DAILY
Qty: 90 TABLET | Refills: 0 | Status: SHIPPED | OUTPATIENT
Start: 2024-02-12 | End: 2024-05-09

## 2024-02-21 ENCOUNTER — PATIENT OUTREACH (OUTPATIENT)
Dept: CARE COORDINATION | Facility: CLINIC | Age: 74
End: 2024-02-21
Payer: COMMERCIAL

## 2024-04-19 DIAGNOSIS — I10 ESSENTIAL HYPERTENSION: ICD-10-CM

## 2024-04-19 RX ORDER — LOSARTAN POTASSIUM 100 MG/1
TABLET ORAL
Qty: 45 TABLET | Refills: 3 | Status: SHIPPED | OUTPATIENT
Start: 2024-04-19

## 2024-05-07 ENCOUNTER — OFFICE VISIT (OUTPATIENT)
Dept: FAMILY MEDICINE | Facility: CLINIC | Age: 74
End: 2024-05-07
Payer: COMMERCIAL

## 2024-05-07 VITALS
OXYGEN SATURATION: 98 % | DIASTOLIC BLOOD PRESSURE: 75 MMHG | SYSTOLIC BLOOD PRESSURE: 113 MMHG | TEMPERATURE: 98.1 F | HEART RATE: 80 BPM | RESPIRATION RATE: 16 BRPM

## 2024-05-07 DIAGNOSIS — T14.8XXA PUNCTURE WOUND: Primary | ICD-10-CM

## 2024-05-07 PROCEDURE — 99213 OFFICE O/P EST LOW 20 MIN: CPT | Performed by: STUDENT IN AN ORGANIZED HEALTH CARE EDUCATION/TRAINING PROGRAM

## 2024-05-07 RX ORDER — CHLORHEXIDINE GLUCONATE 40 MG/ML
SOLUTION TOPICAL DAILY PRN
Qty: 118 ML | Refills: 0 | Status: SHIPPED | OUTPATIENT
Start: 2024-05-07 | End: 2024-07-16

## 2024-05-07 RX ORDER — CHLORHEXIDINE GLUCONATE ORAL RINSE 1.2 MG/ML
SOLUTION DENTAL
Qty: 15 ML | Refills: 0 | Status: SHIPPED | OUTPATIENT
Start: 2024-05-07 | End: 2024-05-07 | Stop reason: ALTCHOICE

## 2024-05-07 RX ORDER — CEFDINIR 300 MG/1
300 CAPSULE ORAL 2 TIMES DAILY
Qty: 10 CAPSULE | Refills: 0 | Status: SHIPPED | OUTPATIENT
Start: 2024-05-07 | End: 2024-05-09

## 2024-05-07 NOTE — PATIENT INSTRUCTIONS
If you develop a fever, chills, trouble moving your wrist or thumb, the redness or swelling expands, please return for reevaluation.

## 2024-05-07 NOTE — PROGRESS NOTES
Assessment & Plan     Puncture wound  - chlorhexidine (Hibiclens) 4% solution  - cefdinir (OMNICEF) 300 MG capsule  Dispense: 10 capsule; Refill: 0    1d following puncture wound from thin wire used to cut chocolate. Bleeding well controlled after the injury with mild pain. Wound was explored for any foreign bodies and evaluated for tendon, nerve, vessel or joint involvement and one found. On exam, Kaden is afebrile with full ROM and no erythema in distal and proximal joints which is reassuring against bacteremia or septic joint. No allergies to antibiotics so will treat with Cefdinir BID and cleansing twice daily with Hibiclens. UTD on tetanus. Return precautions provided and Kaden was understanding of the plan at the time of discharge.    Return for if symptoms do not improve in 2-3 days.    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    Poked with a wire on left palm  Bled a little but put 1 bandaid on it. Continued working with gloves on  Sore and tender, doesn't think any wire could have gotten stuck  Associated symptoms: Denies numbness, weakness, or loss of function    Last tetanus booster within 10 years: Yes. 2021      Past Medical History:   Diagnosis Date    Adenomatous colon polyp     Common wart     Female stress incontinence     Hypertension     Mixed conductive and sensorineural hearing loss of both ears     Tinnitus of both ears     Urge incontinence     Vitamin D deficiency        No Known Allergies  Current Outpatient Medications   Medication Sig Dispense Refill    amLODIPine (NORVASC) 5 MG tablet TAKE 1 TABLET BY MOUTH EVERY DAY 90 tablet 3    calcium citrate (CITRACAL) 950 (200 Ca) MG  tablet Take 1 tablet by mouth 2 times daily      cefdinir (OMNICEF) 300 MG capsule Take 1 capsule (300 mg) by mouth 2 times daily for 5 days 10 capsule 0    chlorhexidine (HIBICLENS) 4 % solution Apply topically daily as needed for wound care 118 mL 0    hydrochlorothiazide (HYDRODIURIL) 12.5 MG tablet Take 1 tablet (12.5 mg) by mouth daily 90 tablet 3    hydrochlorothiazide (HYDRODIURIL) 25 MG tablet Take 0.5 tablets (12.5 mg) by mouth daily 90 tablet 0    ibuprofen (ADVIL/MOTRIN) 600 MG tablet Take 1 tablet (600 mg) by mouth every 6 hours as needed for other (mild and/or inflammatory pain) 30 tablet 0    losartan (COZAAR) 100 MG tablet TAKE 1/2 OF A TABLET (50 MG) BY MOUTH DAILY 45 tablet 3    multivitamin (ONE-DAILY) tablet Take 1 tablet by mouth daily      vit A/vit C/vit E/zinc/copper (PRESERVISION AREDS ORAL) [VIT A/VIT C/VIT E/ZINC/COPPER (PRESERVISION AREDS ORAL)] Take by mouth.       No current facility-administered medications for this visit.        Review of Systems  Constitutional, HEENT, cardiovascular, pulmonary, gi and gu systems are negative, except as otherwise noted.      Objective    /75   Pulse 80   Temp 98.1  F (36.7  C) (Oral)   Resp 16   LMP  (LMP Unknown)   SpO2 98%   Physical Exam  HENT:      Nose: Nose normal.      Mouth/Throat:      Mouth: Mucous membranes are moist.   Eyes:      Pupils: Pupils are equal, round, and reactive to light.   Cardiovascular:      Rate and Rhythm: Normal rate.   Pulmonary:      Effort: Pulmonary effort is normal.   Musculoskeletal:      Left hand: Tenderness (left thenar eminance) present. Normal range of motion (full ROM in joints proximal and distal to injury).   Skin:     Findings: Signs of injury (pinpoint on left thenar eminance with small surrounding erythema and swelling) present.   Neurological:      Mental Status: She is alert.            The use of Dragon/[employer] services may have been used to construct the content in this note;  any grammatical or spelling errors are non-intentional. Please contact the author of this note directly if you are in need of any clarification.

## 2024-05-09 ENCOUNTER — OFFICE VISIT (OUTPATIENT)
Dept: INTERNAL MEDICINE | Facility: CLINIC | Age: 74
End: 2024-05-09
Payer: COMMERCIAL

## 2024-05-09 VITALS
SYSTOLIC BLOOD PRESSURE: 122 MMHG | TEMPERATURE: 97.6 F | RESPIRATION RATE: 16 BRPM | OXYGEN SATURATION: 95 % | HEART RATE: 80 BPM | HEIGHT: 65 IN | BODY MASS INDEX: 28.92 KG/M2 | DIASTOLIC BLOOD PRESSURE: 84 MMHG | WEIGHT: 173.6 LBS

## 2024-05-09 DIAGNOSIS — Z87.891 HISTORY OF SMOKING: ICD-10-CM

## 2024-05-09 DIAGNOSIS — I10 ESSENTIAL HYPERTENSION: ICD-10-CM

## 2024-05-09 DIAGNOSIS — Z13.228 SCREENING FOR ENDOCRINE, METABOLIC AND IMMUNITY DISORDER: ICD-10-CM

## 2024-05-09 DIAGNOSIS — E66.3 OVERWEIGHT: ICD-10-CM

## 2024-05-09 DIAGNOSIS — Z13.0 SCREENING FOR ENDOCRINE, METABOLIC AND IMMUNITY DISORDER: ICD-10-CM

## 2024-05-09 DIAGNOSIS — M85.89 OSTEOPENIA OF MULTIPLE SITES: ICD-10-CM

## 2024-05-09 DIAGNOSIS — Z12.11 SPECIAL SCREENING FOR MALIGNANT NEOPLASMS, COLON: ICD-10-CM

## 2024-05-09 DIAGNOSIS — Z00.00 ENCOUNTER FOR ANNUAL WELLNESS VISIT (AWV) IN MEDICARE PATIENT: Primary | ICD-10-CM

## 2024-05-09 DIAGNOSIS — Z13.29 SCREENING FOR ENDOCRINE, METABOLIC AND IMMUNITY DISORDER: ICD-10-CM

## 2024-05-09 PROCEDURE — 99214 OFFICE O/P EST MOD 30 MIN: CPT | Mod: 25 | Performed by: INTERNAL MEDICINE

## 2024-05-09 PROCEDURE — G0439 PPPS, SUBSEQ VISIT: HCPCS | Performed by: INTERNAL MEDICINE

## 2024-05-09 RX ORDER — PHENTERMINE HYDROCHLORIDE 15 MG/1
15 CAPSULE ORAL EVERY MORNING
Qty: 30 CAPSULE | Refills: 0 | Status: SHIPPED | OUTPATIENT
Start: 2024-05-09

## 2024-05-09 SDOH — HEALTH STABILITY: PHYSICAL HEALTH: ON AVERAGE, HOW MANY DAYS PER WEEK DO YOU ENGAGE IN MODERATE TO STRENUOUS EXERCISE (LIKE A BRISK WALK)?: 5 DAYS

## 2024-05-09 SDOH — HEALTH STABILITY: PHYSICAL HEALTH: ON AVERAGE, HOW MANY MINUTES DO YOU ENGAGE IN EXERCISE AT THIS LEVEL?: 60 MIN

## 2024-05-09 ASSESSMENT — SOCIAL DETERMINANTS OF HEALTH (SDOH): HOW OFTEN DO YOU GET TOGETHER WITH FRIENDS OR RELATIVES?: MORE THAN THREE TIMES A WEEK

## 2024-05-09 NOTE — PATIENT INSTRUCTIONS
To schedule DXA scan [phone].    To schedule CT chest [phone].    To schedule colonoscopy.    To schedule fasting lab appointment.    Phentermine appetite suppressant

## 2024-05-09 NOTE — PROGRESS NOTES
"Preventive Care Visit  [facility]  Ayaan Mcguire MD, Internal Medicine  May 9, 2024      Assessment & Plan     Encounter for annual wellness visit (AWV) in Medicare patient      Hypertension  Stable on hydrochlorothiazide, amlodipine and losartan  - CBC with platelets; Future  - Comprehensive metabolic panel; Future  - TSH with free T4 reflex; Future  - UA Macroscopic with reflex to Microscopic and Culture; Future    Osteopenia of multiple sites  Last DXA 1/2022.  - DX Bone Density; Future  - TSH with free T4 reflex; Future  - Vitamin D Deficiency; Future    Special screening for malignant neoplasms, colon    - Colonoscopy Screening  Referral; Future    Screening for endocrine, metabolic and immunity disorder    - Hemoglobin A1c; Future  - Lipid panel reflex to direct LDL Fasting; Future    Overweight  We will try phentermine for a month.  - phentermine 15 MG capsule; Take 1 capsule (15 mg) by mouth every morning    History of smoking  From age [age].  - CT Chest Low Dose Non Contrast; Future              BMI  Estimated body mass index is 28.89 kg/m  as calculated from the following:    Height as of this encounter: 1.651 m (5' 5\").    Weight as of this encounter: 78.7 kg (173 lb 9.6 oz).       Counseling  Appropriate preventive services were discussed with this patient, including applicable screening as appropriate for fall prevention, nutrition, physical activity, Tobacco-use cessation, weight loss and cognition.  Checklist reviewing preventive services available has been given to the patient.  Reviewed patient's diet, addressing concerns and/or questions.   She is at risk for psychosocial distress and has been provided with information to reduce risk.   The patient was provided with written information regarding signs of hearing loss.   Information on urinary incontinence and treatment options given to patient.           Natalie Alfonso is a 73 year old, presenting for the " following:  Wellness Visit (Mammo 10/25/23 DXA 1/03/22 Colon 10/30/19 )        5/9/2024    12:46 PM   Additional Questions   Roomed by Carly         Health Care Directive  Patient does not have a Health Care Directive or Living Will: Discussed advance care planning with patient; however, patient declined at this time.    HPI              5/9/2024   General Health   How would you rate your overall physical health? Good   Feel stress (tense, anxious, or unable to sleep) Only a little   (!) STRESS CONCERN      5/9/2024   Nutrition   Diet: Regular (no restrictions)         5/9/2024   Exercise   Days per week of moderate/strenous exercise 5 days   Average minutes spent exercising at this level 60 min         5/9/2024   Social Factors   Frequency of gathering with friends or relatives More than three times a week   Worry food won't last until get money to buy more No   Food not last or not have enough money for food? No   Do you have housing?  Yes   Are you worried about losing your housing? No   Lack of transportation? No   Unable to get utilities (heat,electricity)? No         5/9/2024   Fall Risk   Fallen 2 or more times in the past year? No    No   Trouble with walking or balance? No    No          5/9/2024   Activities of Daily Living- Home Safety   Needs help with the following daily activites None of the above   Safety concerns in the home None of the above         5/9/2024   Dental   Dentist two times every year? Yes         5/9/2024   Hearing Screening   Hearing concerns? (!) IT'S HARD TO FOLLOW A CONVERSATION IN A NOISY RESTAURANT OR CROWDED ROOM.    (!) TROUBLE UNDESTANDING A SPEAKER IN A PUBLIC MEETING OR [facility] SERVICE.    (!) TROUBLE UNDERSTANDING SOFT OR WHISPERED SPEECH.         5/9/2024   Driving Risk Screening   Patient/family members have concerns about driving No         5/9/2024   General Alertness/Fatigue Screening   Have you been more tired than usual lately? No         5/9/2024   Urinary  Incontinence Screening   Bothered by leaking urine in past 6 months Yes         2024   TB Screening   Were you born outside of the [place]? No         Today's PHQ-2 Score:       2024    12:41 PM   PHQ-2 (  [employer])   Q1: Little interest or pleasure in doing things 0   Q2: Feeling down, depressed or hopeless 0   PHQ-2 Score 0   Q1: Little interest or pleasure in doing things Not at all   Q2: Feeling down, depressed or hopeless Not at all   PHQ-2 Score 0           2024   Substance Use   Alcohol more than 3/day or more than 7/wk No   Do you have a current opioid prescription? No   How severe/bad is pain from 1 to 10? 2/10   Do you use any other substances recreationally? (!) ALCOHOL    (!) CANNABIS PRODUCTS     Social History     Tobacco Use    Smoking status: Former     Current packs/day: 0.00     Average packs/day: 0.3 packs/day for 45.2 years (11.3 ttl pk-yrs)     Types: Cigarettes     Start date: 1970     Quit date: 2015     Years since quittin.4     Passive exposure: Never    Smokeless tobacco: Never   Vaping Use    Vaping status: Never Used   Substance Use Topics    Alcohol use: Yes     Alcohol/week: 3.0 standard drinks of alcohol     Types: 3 Standard drinks or equivalent per week     Comment: 4 drinks per week    Drug use: Yes     Types: Marijuana           10/25/2023   LAST FHS-7 RESULTS   1st degree relative breast or ovarian cancer No   Any relative bilateral breast cancer No   Any male have breast cancer No   Any ONE woman have BOTH breast AND ovarian cancer No   Any woman with breast cancer before 50yrs No   2 or more relatives with breast AND/OR ovarian cancer No   2 or more relatives with breast AND/OR bowel cancer No            ASCVD Risk   The 10-year ASCVD risk score (Prema EDWARDS, et al., 2019) is: 13.6%    Values used to calculate the score:      Age: 73 years      Sex: Female      Is Non- : No      Diabetic: No      Tobacco smoker: No       "Systolic Blood Pressure: 122 mmHg      Is BP treated: Yes      HDL Cholesterol: 60 mg/dL      Total Cholesterol: 171 mg/dL            Reviewed and updated as needed this visit by Provider                      Current providers sharing in care for this patient include:  Patient Care Team:  Ayaan Mcguire MD as PCP - General (Internal Medicine)  Alyssa Greene APRN CNP as Assigned PCP    The following health maintenance items are reviewed in Epic and correct as of today:  Health Maintenance   Topic Date Due    COVID-19 Vaccine (5 - 2023-24 season) 09/01/2023    MEDICARE ANNUAL WELLNESS VISIT  03/08/2024    ANNUAL REVIEW OF HM ORDERS  03/08/2024    COLORECTAL CANCER SCREENING  10/30/2024    FALL RISK ASSESSMENT  05/09/2025    MAMMO SCREENING  10/25/2025    GLUCOSE  03/08/2026    DEXA  01/03/2027    LIPID  03/08/2028    ADVANCE CARE PLANNING  03/08/2028    DTAP/TDAP/TD IMMUNIZATION (4 - Td or Tdap) 12/15/2031    PHQ-2 (once per calendar year)  Completed    INFLUENZA VACCINE  Completed    Pneumococcal Vaccine: 65+ Years  Completed    ZOSTER IMMUNIZATION  Completed    RSV VACCINE (Pregnancy & 60+)  Completed    IPV IMMUNIZATION  Aged Out    HPV IMMUNIZATION  Aged Out    MENINGITIS IMMUNIZATION  Aged Out    RSV MONOCLONAL ANTIBODY  Aged Out    HEPATITIS C SCREENING  Discontinued    LUNG CANCER SCREENING  Discontinued            Objective    Exam  /84   Pulse 80   Temp 97.6  F (36.4  C)   Resp 16   Ht 1.651 m (5' 5\")   Wt 78.7 kg (173 lb 9.6 oz)   LMP  (LMP Unknown)   SpO2 95%   BMI 28.89 kg/m     Estimated body mass index is 28.89 kg/m  as calculated from the following:    Height as of this encounter: 1.651 m (5' 5\").    Weight as of this encounter: 78.7 kg (173 lb 9.6 oz).    Physical Exam  General Appearance: Alert, cooperative, no distress, appears stated age.  Head: Normocephalic, without obvious abnormality, atraumatic  Eyes: PERRL, conjunctiva/corneas clear, EOM's intact  Ears: Normal TM's and " external ear canals, both ears  Nose: Nares normal, septum midline,mucosa normal, no drainage  Throat: Lips, mucosa, and tongue normal; teeth and gums normal  Neck: Supple, symmetrical, trachea midline, no adenopathy;  thyroid: not enlarged, symmetric, no tenderness/mass/nodules; no carotid bruit or JVD  Back: Symmetric, no curvature, ROM normal, no CVA tenderness.  Lungs: Clear to auscultation bilaterally, respirations unlabored.  Breasts: No breast masses, tenderness, asymmetry, or nipple discharge.  Heart: Regular rate and rhythm, S1 and S2 normal, no murmur, rub, or gallop.  Abdomen: Soft, non-tender, bowel sounds active all four quadrants,  no masses, no organomegaly.  Extremities: Extremities normal, atraumatic, no cyanosis or edema.  Skin: Skin color, texture, turgor normal, no rashes or lesions.  Lymph nodes: Cervical, supraclavicular, and axillary nodes normal.  Neurologic: No focal neurological findings.          5/9/2024   Mini Cog   Clock Draw Score 2 Normal   3 Item Recall 3 objects recalled   Mini Cog Total Score 5              Signed Electronically by: Ayaan Mcguire MD

## 2024-05-13 ENCOUNTER — LAB (OUTPATIENT)
Dept: LAB | Facility: CLINIC | Age: 74
End: 2024-05-13
Payer: COMMERCIAL

## 2024-05-13 DIAGNOSIS — Z13.228 SCREENING FOR ENDOCRINE, METABOLIC AND IMMUNITY DISORDER: ICD-10-CM

## 2024-05-13 DIAGNOSIS — M85.89 OSTEOPENIA OF MULTIPLE SITES: ICD-10-CM

## 2024-05-13 DIAGNOSIS — Z13.0 SCREENING FOR ENDOCRINE, METABOLIC AND IMMUNITY DISORDER: ICD-10-CM

## 2024-05-13 DIAGNOSIS — Z13.29 SCREENING FOR ENDOCRINE, METABOLIC AND IMMUNITY DISORDER: ICD-10-CM

## 2024-05-13 DIAGNOSIS — I10 ESSENTIAL HYPERTENSION: ICD-10-CM

## 2024-05-13 LAB
ALBUMIN SERPL BCG-MCNC: 4.2 G/DL (ref 3.5–5.2)
ALP SERPL-CCNC: 95 U/L (ref 40–150)
ALT SERPL W P-5'-P-CCNC: 30 U/L (ref 0–50)
ANION GAP SERPL CALCULATED.3IONS-SCNC: 9 MMOL/L (ref 7–15)
AST SERPL W P-5'-P-CCNC: 30 U/L (ref 0–45)
BILIRUB SERPL-MCNC: 0.4 MG/DL
BUN SERPL-MCNC: 15.2 MG/DL (ref 8–23)
CALCIUM SERPL-MCNC: 9.5 MG/DL (ref 8.8–10.2)
CHLORIDE SERPL-SCNC: 103 MMOL/L (ref 98–107)
CHOLEST SERPL-MCNC: 160 MG/DL
CREAT SERPL-MCNC: 1.01 MG/DL (ref 0.51–0.95)
DEPRECATED HCO3 PLAS-SCNC: 27 MMOL/L (ref 22–29)
EGFRCR SERPLBLD CKD-EPI 2021: 59 ML/MIN/1.73M2
ERYTHROCYTE [DISTWIDTH] IN BLOOD BY AUTOMATED COUNT: 13.1 % (ref 10–15)
FASTING STATUS PATIENT QL REPORTED: YES
FASTING STATUS PATIENT QL REPORTED: YES
GLUCOSE SERPL-MCNC: 112 MG/DL (ref 70–99)
HBA1C MFR BLD: 5.8 % (ref 0–5.6)
HCT VFR BLD AUTO: 38.5 % (ref 35–47)
HDLC SERPL-MCNC: 60 MG/DL
HGB BLD-MCNC: 12.6 G/DL (ref 11.7–15.7)
LDLC SERPL CALC-MCNC: 84 MG/DL
MCH RBC QN AUTO: 30.6 PG (ref 26.5–33)
MCHC RBC AUTO-ENTMCNC: 32.7 G/DL (ref 31.5–36.5)
MCV RBC AUTO: 93 FL (ref 78–100)
NONHDLC SERPL-MCNC: 100 MG/DL
PLATELET # BLD AUTO: 278 10E3/UL (ref 150–450)
POTASSIUM SERPL-SCNC: 3.9 MMOL/L (ref 3.4–5.3)
PROT SERPL-MCNC: 6.7 G/DL (ref 6.4–8.3)
RBC # BLD AUTO: 4.12 10E6/UL (ref 3.8–5.2)
SODIUM SERPL-SCNC: 139 MMOL/L (ref 135–145)
TRIGL SERPL-MCNC: 80 MG/DL
TSH SERPL DL<=0.005 MIU/L-ACNC: 2.98 UIU/ML (ref 0.3–4.2)
VIT D+METAB SERPL-MCNC: 69 NG/ML (ref 20–50)
WBC # BLD AUTO: 7.7 10E3/UL (ref 4–11)

## 2024-05-13 PROCEDURE — 82306 VITAMIN D 25 HYDROXY: CPT

## 2024-05-13 PROCEDURE — 85027 COMPLETE CBC AUTOMATED: CPT

## 2024-05-13 PROCEDURE — 83036 HEMOGLOBIN GLYCOSYLATED A1C: CPT | Mod: GZ

## 2024-05-13 PROCEDURE — 36415 COLL VENOUS BLD VENIPUNCTURE: CPT

## 2024-05-13 PROCEDURE — 84443 ASSAY THYROID STIM HORMONE: CPT

## 2024-05-13 PROCEDURE — 80061 LIPID PANEL: CPT

## 2024-05-13 PROCEDURE — 80053 COMPREHEN METABOLIC PANEL: CPT

## 2024-05-14 ENCOUNTER — MYC MEDICAL ADVICE (OUTPATIENT)
Dept: INTERNAL MEDICINE | Facility: CLINIC | Age: 74
End: 2024-05-14
Payer: COMMERCIAL

## 2024-05-14 ENCOUNTER — E-VISIT (OUTPATIENT)
Dept: INTERNAL MEDICINE | Facility: CLINIC | Age: 74
End: 2024-05-14
Payer: COMMERCIAL

## 2024-05-14 DIAGNOSIS — N39.0 ACUTE UTI (URINARY TRACT INFECTION): Primary | ICD-10-CM

## 2024-05-14 PROCEDURE — 99421 OL DIG E/M SVC 5-10 MIN: CPT | Performed by: INTERNAL MEDICINE

## 2024-05-14 RX ORDER — NITROFURANTOIN 25; 75 MG/1; MG/1
100 CAPSULE ORAL 2 TIMES DAILY
Qty: 10 CAPSULE | Refills: 0 | Status: SHIPPED | OUTPATIENT
Start: 2024-05-14 | End: 2024-05-19

## 2024-05-14 NOTE — PATIENT INSTRUCTIONS
Dear Catarina Mendenhall    After reviewing your responses, I've been able to diagnose you with a urinary tract infection, which is a common infection of the bladder with bacteria.  This is not a sexually transmitted infection, though urinating immediately after intercourse can help prevent infections.  Drinking lots of fluids is also helpful to clear your current infection and prevent the next one.      I have sent a prescription for antibiotics to your pharmacy to treat this infection.    It is important that you take all of your prescribed medication even if your symptoms are improving after a few doses.  Taking all of your medicine helps prevent the symptoms from returning.     If your symptoms worsen, you develop pain in your back or stomach, develop fevers, or are not improving in 5 days, please contact your primary care provider for an appointment or visit any of our convenient Walk-in or Urgent Care Centers to be seen, which can be found on our website here.    Thanks again for choosing us as your health care partner,    Ayaan Mcguire MD

## 2024-06-03 ENCOUNTER — HOSPITAL ENCOUNTER (OUTPATIENT)
Dept: CT IMAGING | Facility: CLINIC | Age: 74
Discharge: HOME | End: 2024-06-03
Attending: INTERNAL MEDICINE | Admitting: INTERNAL MEDICINE
Payer: COMMERCIAL

## 2024-06-03 ENCOUNTER — ANCILLARY PROCEDURE (OUTPATIENT)
Dept: BONE DENSITY | Facility: CLINIC | Age: 74
End: 2024-06-03
Attending: INTERNAL MEDICINE
Payer: COMMERCIAL

## 2024-06-03 DIAGNOSIS — Z87.891 HISTORY OF SMOKING: ICD-10-CM

## 2024-06-03 DIAGNOSIS — M85.89 OSTEOPENIA OF MULTIPLE SITES: ICD-10-CM

## 2024-06-03 PROCEDURE — 77080 DXA BONE DENSITY AXIAL: CPT | Mod: TC | Performed by: RADIOLOGY

## 2024-06-03 PROCEDURE — 77089 TBS DXA CAL W/I&R FX RISK: CPT | Performed by: RADIOLOGY

## 2024-06-03 PROCEDURE — 71250 CT THORAX DX C-: CPT

## 2024-07-15 ENCOUNTER — HOSPITAL ENCOUNTER (OUTPATIENT)
Dept: ULTRASOUND IMAGING | Facility: CLINIC | Age: 74
Discharge: HOME | End: 2024-07-15
Attending: INTERNAL MEDICINE | Admitting: INTERNAL MEDICINE
Payer: COMMERCIAL

## 2024-07-15 DIAGNOSIS — K76.89 LIVER CYST: ICD-10-CM

## 2024-07-15 PROCEDURE — 76705 ECHO EXAM OF ABDOMEN: CPT

## 2024-07-16 DIAGNOSIS — K76.89 LIVER CYST: Primary | ICD-10-CM

## 2024-07-29 ENCOUNTER — PATIENT OUTREACH (OUTPATIENT)
Dept: GASTROENTEROLOGY | Facility: CLINIC | Age: 74
End: 2024-07-29
Payer: COMMERCIAL

## 2024-08-20 ENCOUNTER — TRANSFERRED RECORDS (OUTPATIENT)
Dept: HEALTH INFORMATION MANAGEMENT | Facility: CLINIC | Age: 74
End: 2024-08-20
Payer: COMMERCIAL

## 2024-09-21 DIAGNOSIS — I10 ESSENTIAL HYPERTENSION: ICD-10-CM

## 2024-09-23 RX ORDER — HYDROCHLOROTHIAZIDE 25 MG/1
12.5 TABLET ORAL DAILY
Qty: 45 TABLET | Refills: 1 | OUTPATIENT
Start: 2024-09-23

## 2024-10-14 ENCOUNTER — MYC REFILL (OUTPATIENT)
Dept: INTERNAL MEDICINE | Facility: CLINIC | Age: 74
End: 2024-10-14
Payer: COMMERCIAL

## 2024-10-14 DIAGNOSIS — E66.3 OVERWEIGHT: ICD-10-CM

## 2024-10-14 DIAGNOSIS — I10 ESSENTIAL HYPERTENSION: ICD-10-CM

## 2024-10-14 RX ORDER — PHENTERMINE HYDROCHLORIDE 15 MG/1
15 CAPSULE ORAL EVERY MORNING
Qty: 30 CAPSULE | Refills: 0 | Status: SHIPPED | OUTPATIENT
Start: 2024-10-14

## 2024-10-14 RX ORDER — HYDROCHLOROTHIAZIDE 12.5 MG/1
12.5 TABLET ORAL DAILY
Qty: 90 TABLET | Refills: 3 | Status: SHIPPED | OUTPATIENT
Start: 2024-10-14

## 2024-12-30 DIAGNOSIS — I10 ESSENTIAL HYPERTENSION: ICD-10-CM

## 2024-12-30 RX ORDER — AMLODIPINE BESYLATE 5 MG/1
TABLET ORAL
Qty: 90 TABLET | Refills: 3 | Status: SHIPPED | OUTPATIENT
Start: 2024-12-30

## 2025-02-19 ENCOUNTER — NURSE TRIAGE (OUTPATIENT)
Dept: INTERNAL MEDICINE | Facility: CLINIC | Age: 75
End: 2025-02-19
Payer: COMMERCIAL

## 2025-02-19 NOTE — TELEPHONE ENCOUNTER
Provider Recommendation Follow Up:   Patient returned call to clinic. Informed of provider's recommendations. Patient verbalized understanding and agrees with the plan. Patient already has appointment with PCP scheduled tomorrow, and will go to ER if any episodes occur again.    Nelly Rodgers RN  [facility]

## 2025-02-19 NOTE — TELEPHONE ENCOUNTER
S-(situation): Patient calls with multiple cardiac symptoms that have been occurring on and off since 2024.    B-(background): History of hypertension. Sibling diagnosed with arrhthymias. Father  of heart attack at 76.    A-(assessment): Patient reports multiple episodes of rapid heartbeat that have occurred on and off since December. Patient notes these symptoms can last 1-60 minutes. Occasionally noting a palpitation. Patient denies any shortness of breath, weakness, or dizziness. Patient states these episodes occasionally wake her out of her sleep, last occurring last night- woke patient up twice overnight. Patient states she was able to hold her breath to slow her heart rate down and fell back asleep.    Patient notes on 25 she had a 60 minute episode of what felt like rapid heartbeat. Patient notes her pulse was 100 at the time. Patient states it felt like her pulse was popping out of her neck on and off during the episode. Patient notes she had what felt like indigestion at the time of the episode.     R-(recommendations): Protocol says be seen today. No appointments in clinic. Scheduled for tomorrow. Is this appropriate? Patient agrees to go to ER if an episode occurs again.    Reason for Disposition   Age > 60 years  (Exception: Brief heartbeat symptoms that went away and now feels well.)    Additional Information   Negative: Passed out (e.g., fainted, lost consciousness, blacked out and was not responding)   Negative: Shock suspected (e.g., cold/pale/clammy skin, too weak to stand, low BP, rapid pulse)   Negative: Difficult to awaken or acting confused (e.g., disoriented, slurred speech)   Negative: Visible sweat on face or sweat dripping down face   Negative: Unable to walk, or can only walk with assistance (e.g., requires support)   Negative: Received SHOCK from implantable cardiac defibrillator and has persisting symptoms (i.e., palpitations, lightheadedness)   Negative: Feeling weak or  "lightheaded (e.g., woozy, feeling like they might faint) AND heart beating very rapidly (e.g., > 140 / minute)   Negative: Feeling weak or lightheaded (e.g., woozy, feeling like they might faint) AND heart beating very slowly (e.g., < 50 / minute)   Negative: Sounds like a life-threatening emergency to the triager   Negative: Chest pain   Negative: Difficulty breathing   Negative: Feeling weak or lightheaded (e.g., woozy, feeling like they might faint)   Negative: Heart beating very rapidly (e.g., > 140 / minute) and present now  (Exception: During exercise.)   Negative: Heart beating very slowly (e.g., < 50 / minute)  (Exception: Athlete and heart rate normal for caller.)   Negative: New or worsened shortness of breath with activity (dyspnea on exertion)   Negative: Patient sounds very sick or weak to the triager   Negative: Wearing a cardiac monitor (e.g., cardiac event, Holter)   Negative: Received SHOCK from implantable cardiac defibrillator (and now feels well)   Negative: Skipped or extra beat(s) and occurs 4 or more times per minute   Negative: History of heart disease (i.e., heart attack, bypass surgery, angina, angioplasty)  (Exception: Brief heartbeat symptoms that went away and now feels well.)   Negative: Heart beating very rapidly (e.g., > 140 / minute) and not present now  (Exception: During exercise.)   Negative: Skipped or extra beat(s) and increases with exercise or exertion    Answer Assessment - Initial Assessment Questions  1. DESCRIPTION: \"Please describe your heart rate or heartbeat that you are having\" (e.g., fast/slow, regular/irregular, skipped or extra beats, \"palpitations\")      Patient reports occasional episodes of palpitations and or rapid heartbeat  2. ONSET: \"When did it start?\" (e.g., minutes, hours, days)       Patient states episodes started shortly before [place]. Patient states the episodes seemed to go away, but then started up again a few weeks ago. Last episode was early " "this morning while patient was sleeping. Episode woke up up out of her sleep.  3. DURATION: \"How long does it last\" (e.g., seconds, minutes, hours)      Minutes to one hour  4. PATTERN \"Does it come and go, or has it been constant since it started?\"  \"Does it get worse with exertion?\"   \"Are you feeling it now?\"      Comes and goes. Does not seem worse with exertion. Patient does wonder if caffeine has a negative impact on symptoms. Patient reports she is feeling fine now while speaking with caller.  5. TAP: \"Using your hand, can you tap out what you are feeling on a chair or table in front of you, so that I can hear?\" Note: Not all patients can do this.          6. HEART RATE: \"Can you tell me your heart rate?\" \"How many beats in 15 seconds?\"  Note: Not all patients can do this.        Patient states heart rate seems steady now. Patient states during her last episode, heart rate was 100.  7. RECURRENT SYMPTOM: \"Have you ever had this before?\" If Yes, ask: \"When was the last time?\" and \"What happened that time?\"       No  8. CAUSE: \"What do you think is causing the palpitations?\"      Patient is not sure  9. CARDIAC HISTORY: \"Do you have any history of heart disease?\" (e.g., heart attack, angina, bypass surgery, angioplasty, arrhythmia)       Hypertension  10. OTHER SYMPTOMS: \"Do you have any other symptoms?\" (e.g., dizziness, chest pain, sweating, difficulty breathing)        No  11. PREGNANCY: \"Is there any chance you are pregnant?\" \"When was your last menstrual period?\"        No    Protocols used: Heart Rate and Heartbeat [id number]    "

## 2025-02-20 ENCOUNTER — OFFICE VISIT (OUTPATIENT)
Dept: INTERNAL MEDICINE | Facility: CLINIC | Age: 75
End: 2025-02-20
Payer: COMMERCIAL

## 2025-02-20 VITALS
DIASTOLIC BLOOD PRESSURE: 76 MMHG | SYSTOLIC BLOOD PRESSURE: 118 MMHG | RESPIRATION RATE: 16 BRPM | TEMPERATURE: 97.5 F | HEART RATE: 64 BPM | OXYGEN SATURATION: 96 %

## 2025-02-20 DIAGNOSIS — R07.89 OTHER CHEST PAIN: ICD-10-CM

## 2025-02-20 DIAGNOSIS — R73.03 PREDIABETES: ICD-10-CM

## 2025-02-20 DIAGNOSIS — R00.2 PALPITATIONS: Primary | ICD-10-CM

## 2025-02-20 DIAGNOSIS — K21.00 GASTROESOPHAGEAL REFLUX DISEASE WITH ESOPHAGITIS WITHOUT HEMORRHAGE: ICD-10-CM

## 2025-02-20 DIAGNOSIS — I10 ESSENTIAL HYPERTENSION: ICD-10-CM

## 2025-02-20 LAB
ATRIAL RATE - MUSE: 63 BPM
DIASTOLIC BLOOD PRESSURE - MUSE: NORMAL MMHG
INTERPRETATION ECG - MUSE: NORMAL
P AXIS - MUSE: 30 DEGREES
PR INTERVAL - MUSE: 190 MS
QRS DURATION - MUSE: 80 MS
QT - MUSE: 412 MS
QTC - MUSE: 421 MS
R AXIS - MUSE: -4 DEGREES
SYSTOLIC BLOOD PRESSURE - MUSE: NORMAL MMHG
T AXIS - MUSE: 0 DEGREES
VENTRICULAR RATE- MUSE: 63 BPM

## 2025-02-20 RX ORDER — OMEPRAZOLE 20 MG/1
20 CAPSULE, DELAYED RELEASE ORAL DAILY
Qty: 60 CAPSULE | Refills: 1 | Status: SHIPPED | OUTPATIENT
Start: 2025-02-20

## 2025-02-20 NOTE — PROGRESS NOTES
Assessment & Plan     Palpitations  On and off since 2024.  Patient reports multiple episodes of rapid heartbeat that have occurred on and off since December. Patient notes these symptoms can last 1-60 minutes. Occasionally noting a palpitation. Patient denies any shortness of breath, weakness, or dizziness. Patient states these episodes occasionally wake her out of her sleep, last occurring last night- woke patient up twice overnight. Patient states she was able to hold her breath to slow her heart rate down and fell back asleep.     Patient notes on 25 she had a 60 minute episode of what felt like rapid heartbeat. Patient notes her pulse was 100 at the time. Patient states it felt like her pulse was popping out of her neck on and off during the episode. Patient notes she had what felt like indigestion at the time of the episode.     Sibling diagnosed with arrhthymias. Father  of heart attack at 76.     EKG done today  per my review showed NSR, HR 60, some non specific T change in inferior and lateral leads.  We will do the Zio patch today.    She has scheduled physical in 2 weeks, and would like the blood work to be done at that time.  - EKG 12-lead, tracing only  - ZIO PATCH 8-14 DAYS (additional cost to patient)  - ZIO PATCH 8-14 DAYS APPLICATION    Hypertension  Well controlled with amlodipine, hydrochlorothiazide, losartan.    Prediabetes  Component      Latest Ref Rng 2024  7:48 AM   Hemoglobin A1C      0.0 - 5.6 % 5.8 (H)           Gastroesophageal reflux disease with esophagitis without hemorrhage  She noticed more indigestion and GERD over the last few months, with some regurgitations. She noticed some chest pain when has GERD symptoms. No hematemesis, no black stool. She feels nausea often.   She has scheduled colonoscopy next Tuesday and we will add EGD to be done at the same time.  She will start omeprazole.  - omeprazole (PRILOSEC) 20 MG DR capsule; Take 1 capsule (20 mg) by  "mouth daily. Twice a day for 2 weeks, then once a day in the morning  - Adult GI  Referral - Procedure Only; Future    Other chest pain  Sounds like GERD related. No propagation to the jaw or arm. No dyspnea on exertion.  - Adult GI  Referral - Procedure Only; Future    Indications for emergency department discussed with patient, who verbalized good understanding and agreement.     The longitudinal plan of care for the diagnosis(es)/condition(s) as documented were addressed during this visit. Due to the added complexity in care, I will continue to support Kaden in the subsequent management and with ongoing continuity of care.      BMI  Estimated body mass index is 28.89 kg/m  as calculated from the following:    Height as of 5/9/24: 1.651 m (5' 5\").    Weight as of 5/9/24: 78.7 kg (173 lb 9.6 oz).             Subjective   Kaden is a 74 year old, presenting for the following health issues:  Irregular Heartbeat  (Irregular Heartbeat-Started Around Xmas-Off And On-Acid Reflux? )      2/20/2025     2:06 PM   Additional Questions   Roomed by Carly     HPI                   Objective    /76   Pulse 64   Temp 97.5  F (36.4  C) (Temporal)   Resp 16   LMP  (LMP Unknown)   SpO2 96%   There is no height or weight on file to calculate BMI.  Physical Exam   Constitutional:  oriented to person, place, and time, appears well-nourished. No distress.   HENT:   Head: Normocephalic.   Mouth/Throat: Oropharynx is clear and moist.   Eyes: Conjunctivae are normal. Pupils are equal, round, and reactive to light.   Neck: Normal range of motion. Neck supple.   Cardiovascular: Normal rate, regular rhythm and normal heart sounds.    Pulmonary/Chest: Effort normal and breath sounds normal.   Abdominal: Soft. Bowel sounds are normal.   Musculoskeletal: Normal range of motion.   Neurological: alert and oriented to person, place, and time. Skin: Skin is warm.   Psychiatric: normal mood and affect.             Signed " Electronically by: Ayaan Mcguire MD

## 2025-02-20 NOTE — PROGRESS NOTES
Catarina Mendenhall arrived here on 2/20/2025 2:48 PM for 8-14 Days  Zio monitor placement per ordering provider  for the diagnosis Palpatations.  Patient s skin was prepped per protocol.  is the supervising MD.  Zio monitor was placed.  Instructions were reviewed with and given to the patient.  Patient verbalized understanding of wear, troubleshooting and monitor return instructions.

## 2025-02-20 NOTE — PATIENT INSTRUCTIONS
Start omeprazole for the GERD.    Call [facility] today and ask if the can do upper GI endoscopy with your colonoscopy on Tuesday.    Zio patch today.

## 2025-03-03 SDOH — HEALTH STABILITY: PHYSICAL HEALTH: ON AVERAGE, HOW MANY DAYS PER WEEK DO YOU ENGAGE IN MODERATE TO STRENUOUS EXERCISE (LIKE A BRISK WALK)?: 4 DAYS

## 2025-03-03 SDOH — HEALTH STABILITY: PHYSICAL HEALTH: ON AVERAGE, HOW MANY MINUTES DO YOU ENGAGE IN EXERCISE AT THIS LEVEL?: 60 MIN

## 2025-03-03 ASSESSMENT — SOCIAL DETERMINANTS OF HEALTH (SDOH): HOW OFTEN DO YOU GET TOGETHER WITH FRIENDS OR RELATIVES?: MORE THAN THREE TIMES A WEEK

## 2025-03-04 ENCOUNTER — OFFICE VISIT (OUTPATIENT)
Dept: INTERNAL MEDICINE | Facility: CLINIC | Age: 75
End: 2025-03-04
Payer: COMMERCIAL

## 2025-03-04 VITALS
TEMPERATURE: 97.8 F | BODY MASS INDEX: 28.49 KG/M2 | OXYGEN SATURATION: 95 % | RESPIRATION RATE: 16 BRPM | WEIGHT: 177.3 LBS | SYSTOLIC BLOOD PRESSURE: 118 MMHG | HEIGHT: 66 IN | HEART RATE: 68 BPM | DIASTOLIC BLOOD PRESSURE: 80 MMHG

## 2025-03-04 DIAGNOSIS — R73.03 PREDIABETES: ICD-10-CM

## 2025-03-04 DIAGNOSIS — Z13.220 SCREENING FOR LIPOID DISORDERS: ICD-10-CM

## 2025-03-04 DIAGNOSIS — M85.89 OSTEOPENIA OF MULTIPLE SITES: ICD-10-CM

## 2025-03-04 DIAGNOSIS — N18.31 CHRONIC KIDNEY DISEASE, STAGE 3A (H): ICD-10-CM

## 2025-03-04 DIAGNOSIS — K21.00 GASTROESOPHAGEAL REFLUX DISEASE WITH ESOPHAGITIS WITHOUT HEMORRHAGE: ICD-10-CM

## 2025-03-04 DIAGNOSIS — Z12.31 VISIT FOR SCREENING MAMMOGRAM: ICD-10-CM

## 2025-03-04 DIAGNOSIS — Z00.00 ENCOUNTER FOR ANNUAL WELLNESS VISIT (AWV) IN MEDICARE PATIENT: Primary | ICD-10-CM

## 2025-03-04 DIAGNOSIS — I10 ESSENTIAL HYPERTENSION: ICD-10-CM

## 2025-03-04 DIAGNOSIS — K76.89 LIVER CYST: ICD-10-CM

## 2025-03-04 DIAGNOSIS — Z87.891 HISTORY OF SMOKING: ICD-10-CM

## 2025-03-04 DIAGNOSIS — R00.2 PALPITATIONS: ICD-10-CM

## 2025-03-04 LAB
ALBUMIN SERPL BCG-MCNC: 4.2 G/DL (ref 3.5–5.2)
ALBUMIN UR-MCNC: NEGATIVE MG/DL
ALP SERPL-CCNC: 83 U/L (ref 40–150)
ALT SERPL W P-5'-P-CCNC: 23 U/L (ref 0–50)
ANION GAP SERPL CALCULATED.3IONS-SCNC: 11 MMOL/L (ref 7–15)
APPEARANCE UR: CLEAR
AST SERPL W P-5'-P-CCNC: 28 U/L (ref 0–45)
BACTERIA #/AREA URNS HPF: ABNORMAL /HPF
BILIRUB SERPL-MCNC: 0.3 MG/DL
BILIRUB UR QL STRIP: NEGATIVE
BUN SERPL-MCNC: 27.8 MG/DL (ref 8–23)
CALCIUM SERPL-MCNC: 9.7 MG/DL (ref 8.8–10.4)
CHLORIDE SERPL-SCNC: 105 MMOL/L (ref 98–107)
CHOLEST SERPL-MCNC: 192 MG/DL
COLOR UR AUTO: YELLOW
CREAT SERPL-MCNC: 1.04 MG/DL (ref 0.51–0.95)
EGFRCR SERPLBLD CKD-EPI 2021: 56 ML/MIN/1.73M2
ERYTHROCYTE [DISTWIDTH] IN BLOOD BY AUTOMATED COUNT: 13.2 % (ref 10–15)
EST. AVERAGE GLUCOSE BLD GHB EST-MCNC: 128 MG/DL
FASTING STATUS PATIENT QL REPORTED: YES
FASTING STATUS PATIENT QL REPORTED: YES
GLUCOSE SERPL-MCNC: 105 MG/DL (ref 70–99)
GLUCOSE UR STRIP-MCNC: NEGATIVE MG/DL
HBA1C MFR BLD: 6.1 % (ref 0–5.6)
HCO3 SERPL-SCNC: 25 MMOL/L (ref 22–29)
HCT VFR BLD AUTO: 40 % (ref 35–47)
HDLC SERPL-MCNC: 61 MG/DL
HGB BLD-MCNC: 13.2 G/DL (ref 11.7–15.7)
HGB UR QL STRIP: ABNORMAL
KETONES UR STRIP-MCNC: NEGATIVE MG/DL
LDLC SERPL CALC-MCNC: 119 MG/DL
LEUKOCYTE ESTERASE UR QL STRIP: NEGATIVE
MCH RBC QN AUTO: 31.1 PG (ref 26.5–33)
MCHC RBC AUTO-ENTMCNC: 33 G/DL (ref 31.5–36.5)
MCV RBC AUTO: 94 FL (ref 78–100)
NITRATE UR QL: NEGATIVE
NONHDLC SERPL-MCNC: 131 MG/DL
PH UR STRIP: 6 [PH] (ref 5–8)
PLATELET # BLD AUTO: 243 10E3/UL (ref 150–450)
POTASSIUM SERPL-SCNC: 4.3 MMOL/L (ref 3.4–5.3)
PROT SERPL-MCNC: 6.6 G/DL (ref 6.4–8.3)
RBC # BLD AUTO: 4.25 10E6/UL (ref 3.8–5.2)
RBC #/AREA URNS AUTO: ABNORMAL /HPF
SODIUM SERPL-SCNC: 141 MMOL/L (ref 135–145)
SP GR UR STRIP: 1.02 (ref 1–1.03)
SQUAMOUS #/AREA URNS AUTO: ABNORMAL /LPF
TRIGL SERPL-MCNC: 59 MG/DL
TSH SERPL DL<=0.005 MIU/L-ACNC: 2.11 UIU/ML (ref 0.3–4.2)
UROBILINOGEN UR STRIP-ACNC: 0.2 E.U./DL
VIT D+METAB SERPL-MCNC: 44 NG/ML (ref 20–50)
WBC # BLD AUTO: 6.4 10E3/UL (ref 4–11)
WBC #/AREA URNS AUTO: ABNORMAL /HPF

## 2025-03-04 NOTE — PROGRESS NOTES
Preventive Care Visit  [facility]  Ayaan Mcguire MD, Internal Medicine  Mar 4, 2025      Assessment & Plan     Encounter for annual wellness visit (AWV) in Medicare patient      Hypertension  Stable on amlodipine, hydrochlorothiazide, losartan.  - CBC with platelets; Future  - Comprehensive metabolic panel; Future  - UA Macroscopic with reflex to Microscopic and Culture; Future    Osteopenia of multiple sites  DXA 6/2024:  -Lumbar Spine: L1-L4: BMD: 1.252 g/cm2. T-score: 0.6. Z-score: 2.3.  -RIGHT Hip Total: BMD: 0.830 g/cm2. T-score: -1.4. Z-score: 0.2.  -RIGHT Hip Femoral neck: BMD: 0.814 g/cm2. T-score: -1.6. Z-score: 0.2.  -LEFT Hip Total: BMD: 0.832 g/cm2. T-score: -1.4. Z-score: 0.2.  -LEFT Hip Femoral neck: BMD: 0.791 g/cm2. T-score: -1.8. Z-score: 0.0.  INTERVAL CHANGE  -There has been a 0.2% increase in lumbar spine BMD.  -There has been a 0.2% increase in bilateral hip BMD.        FRACTURE RISK  -FRAX Results: The 10 year probability of major osteoporotic fracture is 11.5%, and of hip fracture is 2.3%, based on left femoral neck BMD.  -TBS-adjusted FRAX Results: The 10 year probability of major osteoporotic fracture is 11.2%, and of hip fracture is 2.3%.    Appropriate calcium, vitamin D supplements, along with balance and weight bearing exercise recommended with followup bone density scan in 2 years.   - Vitamin D Deficiency; Future    Prediabetes  Component      Latest Ref Rng 5/13/2024  7:48 AM   Hemoglobin A1C      0.0 - 5.6 % 5.8 (H)    Low carb diet discussed.  - Hemoglobin A1c; Future    Gastroesophageal reflux disease with esophagitis without hemorrhage  She noticed more indigestion and GERD over the last few months, with some regurgitations. She noticed some chest pain when has GERD symptoms. No hematemesis, no black stool. She feels nausea often.   She tried omeprazole for few days, but had more stomach pain.   She is scheduled for colonoscopy and EGD with [facility].  She  will try Pepcid prn.      Palpitations  On and off since 2024.  Patient reports multiple episodes of rapid heartbeat that have occurred on and off since December. Patient notes these symptoms can last 1-60 minutes. Occasionally noting a palpitation. Patient denies any shortness of breath, weakness, or dizziness. Patient states these episodes occasionally wake her out of her sleep, last occurring last night- woke patient up twice overnight. Patient states she was able to hold her breath to slow her heart rate down and fell back asleep.     Patient notes on 25 she had a 60 minute episode of what felt like rapid heartbeat. Patient notes her pulse was 100 at the time. Patient states it felt like her pulse was popping out of her neck on and off during the episode. Patient notes she had what felt like indigestion at the time of the episode.      Sibling diagnosed with arrhthymias. Father  of heart attack at 76.      EKG done 25  per my review showed NSR, HR 60, some non specific T change in inferior and lateral leads.  We did the Zio patch  and result is pending.  - TSH with free T4 reflex; Future  - Adult Cardiology Eval  Referral; Future    History of smoking  CT 2024;  1.  Small pulmonary nodules.  2.  Borderline bronchiectasis.  3.  Large circumscribed low-attenuation liver lesions incompletely assessed but likely represent cysts. Recommend ultrasound for further evaluation.     - CT Chest Lung Cancer Scrn Low Dose wo; Future    Chronic kidney disease, stage 3a (H)  Component      Latest Ref Rng 2024  7:48 AM   Sodium      135 - 145 mmol/L 139    Potassium      3.4 - 5.3 mmol/L 3.9    Carbon Dioxide (CO2)      22 - 29 mmol/L 27    Anion Gap      7 - 15 mmol/L 9    Urea Nitrogen      8.0 - 23.0 mg/dL 15.2    Creatinine      0.51 - 0.95 mg/dL 1.01 (H)    GFR Estimate      >60 mL/min/1.73m2 59 (L)    Calcium      8.8 - 10.2 mg/dL 9.5    Chloride      98 - 107 mmol/L 103    Glucose    "   70 - 99 mg/dL 112 (H)    Alkaline Phosphatase      40 - 150 U/L 95    AST      0 - 45 U/L 30    ALT      0 - 50 U/L 30    Protein Total      6.4 - 8.3 g/dL 6.7    Albumin      3.5 - 5.2 g/dL 4.2    Bilirubin Total      <=1.2 mg/dL 0.4    Patient Fasting? Yes    stable    Screening for lipoid disorders    - Lipid panel reflex to direct LDL Fasting; Future    Liver cyst  US abdomen 7/15/24:  IMPRESSION:  1.  Multiple simple liver cysts are noted.  2.  No evidence of cholelithiasis or cholecystitis.    She saw [facility] and the diagnosis were benign liver cysts.    Visit for screening mammogram    - MA Screen Bilateral w/Eduardo; Future            BMI  Estimated body mass index is 29.06 kg/m  as calculated from the following:    Height as of this encounter: 1.664 m (5' 5.5\").    Weight as of this encounter: 80.4 kg (177 lb 4.8 oz).       Counseling  Appropriate preventive services were addressed with this patient via screening, questionnaire, or discussion as appropriate for fall prevention, nutrition, physical activity, Tobacco-use cessation, social engagement, weight loss and cognition.  Checklist reviewing preventive services available has been given to the patient.  Reviewed patient's diet, addressing concerns and/or questions.   Discussed possible causes of fatigue. The patient was provided with written information regarding signs of hearing loss.   Information on urinary incontinence and treatment options given to patient.           Natalie Alfonso is a 74 year old, presenting for the following:  Wellness Visit (Mammo 10/25/23 DXA 6/03/24 Colon 10/30/19 )        3/4/2025     8:15 AM   Additional Questions   Roomed by Carly MCCOY           Advance Care Planning  Patient does not have a Health Care Directive: Discussed advance care planning with patient; however, patient declined at this time.      3/3/2025   General Health   How would you rate your overall physical health? Good   Feel stress (tense, anxious, or " unable to sleep) Not at all         3/3/2025   Nutrition   Diet: Regular (no restrictions)         3/3/2025   Exercise   Days per week of moderate/strenous exercise 4 days   Average minutes spent exercising at this level 60 min         3/3/2025   Social Factors   Frequency of gathering with friends or relatives More than three times a week   Worry food won't last until get money to buy more No   Food not last or not have enough money for food? No   Do you have housing? (Housing is defined as stable permanent housing and does not include staying ouside in a car, in a tent, in an abandoned building, in an overnight shelter, or couch-surfing.) Yes   Are you worried about losing your housing? No   Lack of transportation? No   Unable to get utilities (heat,electricity)? No         3/4/2025   Fall Risk   Fallen 2 or more times in the past year? No   Trouble with walking or balance? No          3/3/2025   Activities of Daily Living- Home Safety   Needs help with the following daily activites None of the above   Safety concerns in the home None of the above         3/3/2025   Dental   Dentist two times every year? Yes         3/3/2025   Hearing Screening   Hearing concerns? (!) I NEED TO ASK PEOPLE TO SPEAK UP OR REPEAT THEMSELVES.    (!) IT'S HARD TO FOLLOW A CONVERSATION IN A NOISY RESTAURANT OR CROWDED ROOM.    (!) TROUBLE FOLLOWING DIALOGUE IN THE THEATHER.    (!) TROUBLE UNDERSTANDING SOFT OR WHISPERED SPEECH.       Multiple values from one day are sorted in reverse-chronological order         3/3/2025   Driving Risk Screening   Patient/family members have concerns about driving No         3/3/2025   General Alertness/Fatigue Screening   Have you been more tired than usual lately? (!) YES         3/3/2025   Urinary Incontinence Screening   Bothered by leaking urine in past 6 months Yes         5/9/2024   TB Screening   Were you born outside of the [place]? No         Today's PHQ-2 Score:       3/3/2025     9:34 AM   PHQ-2  (  [employer])   Q1: Little interest or pleasure in doing things 0   Q2: Feeling down, depressed or hopeless 0   PHQ-2 Score 0    Q1: Little interest or pleasure in doing things Not at all   Q2: Feeling down, depressed or hopeless Not at all   PHQ-2 Score 0       Patient-reported           3/3/2025   Substance Use   Alcohol more than 3/day or more than 7/wk No   Do you have a current opioid prescription? No   How severe/bad is pain from 1 to 10? 2/10   Do you use any other substances recreationally? No     Social History     Tobacco Use    Smoking status: Former     Current packs/day: 0.00     Average packs/day: 0.3 packs/day for 45.2 years (11.3 ttl pk-yrs)     Types: Cigarettes     Start date: 1970     Quit date: 2015     Years since quittin.2     Passive exposure: Never    Smokeless tobacco: Never   Vaping Use    Vaping status: Never Used   Substance Use Topics    Alcohol use: Yes     Alcohol/week: 3.0 standard drinks of alcohol     Types: 3 Standard drinks or equivalent per week     Comment: 4 drinks per week    Drug use: Yes     Types: Marijuana           10/25/2023   LAST FHS-7 RESULTS   1st degree relative breast or ovarian cancer No   Any relative bilateral breast cancer No   Any male have breast cancer No   Any ONE woman have BOTH breast AND ovarian cancer No   Any woman with breast cancer before 50yrs No   2 or more relatives with breast AND/OR ovarian cancer No   2 or more relatives with breast AND/OR bowel cancer No            ASCVD Risk   The 10-year ASCVD risk score (Prema EDWARDS, et al., 2019) is: 14.2%    Values used to calculate the score:      Age: 74 years      Sex: Female      Is Non- : No      Diabetic: No      Tobacco smoker: No      Systolic Blood Pressure: 118 mmHg      Is BP treated: Yes      HDL Cholesterol: 60 mg/dL      Total Cholesterol: 160 mg/dL            Reviewed and updated as needed this visit by Provider                      Current  "providers sharing in care for this patient include:  Patient Care Team:  Ayaan Mcguire MD as PCP - General (Internal Medicine)  Ayaan Mcguire MD as Assigned PCP    The following health maintenance items are reviewed in Epic and correct as of today:  Health Maintenance   Topic Date Due    INFLUENZA VACCINE (1) 09/01/2024    COLORECTAL CANCER SCREENING  10/30/2024    COVID-19 Vaccine (6 - 2024-25 season) 02/28/2025    MEDICARE ANNUAL WELLNESS VISIT  05/09/2025    BMP  05/13/2025    MAMMO SCREENING  10/25/2025    ANNUAL REVIEW OF HM ORDERS  02/20/2026    FALL RISK ASSESSMENT  03/04/2026    GLUCOSE  05/13/2027    ADVANCE CARE PLANNING  05/09/2029    LIPID  05/13/2029    DEXA  06/03/2029    DTAP/TDAP/TD IMMUNIZATION (4 - Td or Tdap) 12/15/2031    PHQ-2 (once per calendar year)  Completed    Pneumococcal Vaccine: 50+ Years  Completed    ZOSTER IMMUNIZATION  Completed    RSV VACCINE  Completed    HPV IMMUNIZATION  Aged Out    MENINGITIS IMMUNIZATION  Aged Out    HEPATITIS C SCREENING  Discontinued    LUNG CANCER SCREENING  Discontinued            Objective    Exam  /80   Pulse 68   Temp 97.8  F (36.6  C) (Temporal)   Resp 16   Ht 1.664 m (5' 5.5\")   Wt 80.4 kg (177 lb 4.8 oz)   LMP  (LMP Unknown)   SpO2 95%   BMI 29.06 kg/m     Estimated body mass index is 29.06 kg/m  as calculated from the following:    Height as of this encounter: 1.664 m (5' 5.5\").    Weight as of this encounter: 80.4 kg (177 lb 4.8 oz).    Physical Exam  General Appearance: Alert, cooperative, no distress, appears stated age.  Head: Normocephalic, without obvious abnormality, atraumatic  Eyes: PERRL, conjunctiva/corneas clear, EOM's intact  Ears: Normal TM's and external ear canals, both ears  Nose: Nares normal, septum midline,mucosa normal, no drainage  Throat: Lips, mucosa, and tongue normal; teeth and gums normal  Neck: Supple, symmetrical, trachea midline, no adenopathy;  thyroid: not enlarged, symmetric, no " tenderness/mass/nodules; no carotid bruit or JVD  Back: Symmetric, no curvature, ROM normal, no CVA tenderness.  Lungs: Clear to auscultation bilaterally, respirations unlabored.  Breasts: No breast masses, tenderness, asymmetry, or nipple discharge.  Heart: Regular rate and rhythm, S1 and S2 normal, no murmur, rub, or gallop.  Abdomen: Soft, non-tender, bowel sounds active all four quadrants,  no masses, no organomegaly.  Extremities: Extremities normal, atraumatic, no cyanosis or edema.  Skin: Skin color, texture, turgor normal, no rashes or lesions.  Lymph nodes: Cervical, supraclavicular, and axillary nodes normal.  Neurologic: No focal neurological findings.          3/4/2025   Mini Cog   Clock Draw Score 2 Normal   3 Item Recall 3 objects recalled   Mini Cog Total Score 5              Signed Electronically by: Ayaan Mcguire MD

## 2025-03-04 NOTE — PATIENT INSTRUCTIONS
CT chest in June 2025. [phone]  Mammogram now  [phone]    US liver - we will check next year.    Labs today.

## 2025-03-05 ENCOUNTER — PATIENT OUTREACH (OUTPATIENT)
Dept: CARE COORDINATION | Facility: CLINIC | Age: 75
End: 2025-03-05
Payer: COMMERCIAL

## 2025-03-05 LAB — CV ZIO PRELIM RESULTS: NORMAL

## 2025-03-26 DIAGNOSIS — I10 ESSENTIAL HYPERTENSION: ICD-10-CM

## 2025-03-27 ENCOUNTER — HOSPITAL ENCOUNTER (OUTPATIENT)
Dept: CARDIOLOGY | Facility: CLINIC | Age: 75
Discharge: HOME | End: 2025-03-27
Attending: INTERNAL MEDICINE
Payer: COMMERCIAL

## 2025-03-27 DIAGNOSIS — R00.2 PALPITATIONS: ICD-10-CM

## 2025-03-27 LAB — LVEF ECHO: NORMAL

## 2025-03-27 PROCEDURE — 255N000002 HC RX 255 OP 636: Performed by: INTERNAL MEDICINE

## 2025-03-27 RX ORDER — LOSARTAN POTASSIUM 100 MG/1
TABLET ORAL
Qty: 45 TABLET | Refills: 3 | Status: SHIPPED | OUTPATIENT
Start: 2025-03-27

## 2025-03-27 RX ADMIN — PERFLUTREN 3 ML: 6.52 INJECTION, SUSPENSION INTRAVENOUS at 14:42

## 2025-04-23 ENCOUNTER — TRANSFERRED RECORDS (OUTPATIENT)
Dept: ADMINISTRATIVE | Facility: CLINIC | Age: 75
End: 2025-04-23
Payer: COMMERCIAL

## 2025-04-29 NOTE — PROCEDURES
[facility]   [address], [address], [place] [address]     Patient Name: Catarina Mendenhall  Gender:  Female  Exam Date: 04/23/2025 Visit Number:  [id number]  Age: 74 Years YOB: 1950  Attending MD: Christian Moore MD Medical Record#:  [id number]    Procedure: Colonoscopy   Indications: Previous adenomatous polyp(in 2019 and 2014)      Referring MD: Referral Self  Primary MD:      Ayaan Mcguire MD  Medications: Admitting Medications:   0.9% Normal Saline at TKO   Ondansetron Hydrochloride (Zofran) given  4mg  by IV   Intra Procedure Medications:   Patient received monitored anesthesia care.     Complications: No immediate complications  ______________________________________________________________________________  Procedure:   An examination of the heart and lungs was performed and found to be within acceptable limits.  .  The patient was therefore deemed a reasonable candidate for endoscopy and sedation.   The risks and benefits of the procedure were explained to the patient.After obtaining informed consent, the patient received monitored anesthesia care and I passed the scope   without difficulty via the rectum to the cecum.  The appendiceal orifice and ic valve were identified.  The scope was retroflexed during the examination  The quality of the prep was good  (Eric/Gat/4 Bis/MgCit).    This was a complete examination throughout the entire colon.    Findings:    Polyp location: ascending colon.  Quantity: 1.  Size: 6 mm.  Polyp shape:  sessile.         Maneuver: polypectomy was performed with a cold snare.       Removal:  complete.  Retrieval: complete.  Bleeding: none.    Polyp location: descending colon.  Quantity: 2.  Size:  5 mm, 7 mm.  Polyp shape: sessile.         Maneuver: polypectomy was performed with a  cold snare.       Removal: complete.  Retrieval: complete.  Bleeding: none.    Diverticulosis.  Location: - descending colon - sigmoid.        Quantity:   several.    Hemorrhoids.  Internal hemorrhoids without bleeding.    Impression:  Screening Colonoscopy  Colorectal polyp detected on colonoscopy    Preliminary Plan:  The patient and their physician will receive a copy of the pathology report as well as pathology-based recommendations for future screening or surveillance.    Procedure:    Upper GI Endoscopy   Indications:    Reflux  Provider:        Christian Moore MD   Referring MD: Referral Self   Primary MD:      Ayaan Mcguire MD  Medications:   Admitting Medication:   0.9% Normal Saline at TKO   Ondansetron Hydrochloride (Zofran) given  4mg  by IV   Intra Procedure Medications:   Patient received monitored anesthesia care.     Complications: No immediate complications  ___________________________________________________________________________________________  Procedure:   An examination of the heart and lungs was performed within acceptable limits.  . The patient was therefore deemed a reasonable candidate for sedation.   The risks and benefits were explained to the patient, who appeared to understand. After obtaining informed consent, the scope was passed under direct vision. Throughout the procedure the patient's blood pressure, pulse and oxygen saturations were monitored.  The scope was introduced through the mouth and advanced to the second portion of duodenum.         Findings:   Esophagus:    Normal esophagus.   The z-line is 35 centimeters from the incisors.   Stomach:    Erythematous  Location: entire stomach; description:  mild,diffuse    H. Pylori biopsies taken.     Angioectasia. Comments:  Gastric angioecstasia. Location - body. Number - single. small No bleeding present.  Duodenum:    Normal duodenum.  Impression:   Gastroesophageal reflux disease without esophagitis    Preliminary Plan:  Recommendation Comments:  Colonoscopy today.  Pathology Results:  A: STOMACH, BIOPSY:           1. Helicobacter gastritis (Helicobacter immunohistochemistry  positive)           2. Negative for atrophic gastritis and dysplasia           3. Sampling: Antral and body mucosae      B: COLON, DESCENDING, POLYPS:           1. Sessile serrated adenomas (2)           2. Negative for overt dysplasia           3. Per the colonoscopy report:               a. Polyp sizes: 5 mm and 7 mm               b. Resection: Complete               c. Retrieval: Complete      C: COLON, ASCENDING, POLYP:           1. Tubular adenoma           2. Negative for high grade dysplasia           3. Per the colonoscopy report:               a. Polyp size: 6 mm               b. Resection: Complete               c. Retrieval: Complete      MICROSCOPIC  A: Performed.   B: Performed   C: Performed   SPECIAL STAINING/DEEPER  A: IHC Stains: Helicobacter     Electronically signed by: Luis M Gary MD    Interpreted at [facility], [address] [place] [phone]    Orders  Labs:  Test Comments Timeframe Assessment   H. pylori Stool Ag, EIA in 2 to 3 months First Available B96.81         Final Plan:  Repeat colonoscopy in 3 years.    We will attempt to contact you at appropriate intervals via [place] mail.  We may not be able to find you or contact you at that time, therefore you should know that the responsibility for following our recommendation rests with you.  If you don't hear from us at the time your procedure is due, please contact our office to schedule an appointment.  If your contact information should change, please contact our office so that we can update your record.  Additional Comments:  Take the following medicines for 14 days:  Omeprazole 20 mg twice daily  Metronidazole 250 mg four times daily  Doxycycline 100 mg twice daily  Bismuth subsalicylate 262 mg four times daily  After 14 days continue omeprazole 20 mg twice daily for 2 months.  Check stool h pylori antigen test in  2 to 3 months to check for eradication of H pylori.  Your prescriptions have been  sent to your pharmacy.  We discussed this over the phone today.      _Electronically signed by:___________________  Christian Moore MD                 04/23/2025    cc: Ayaan Mcguire MD

## 2025-04-30 ENCOUNTER — MYC MEDICAL ADVICE (OUTPATIENT)
Dept: INTERNAL MEDICINE | Facility: CLINIC | Age: 75
End: 2025-04-30
Payer: COMMERCIAL

## 2025-04-30 DIAGNOSIS — N18.31 CHRONIC KIDNEY DISEASE, STAGE 3A (H): Primary | ICD-10-CM

## 2025-04-30 NOTE — TELEPHONE ENCOUNTER
Routing to PCP to advise if microalbumin is needed at this time or if patient can wait for next visit. Please advise

## 2025-05-07 ENCOUNTER — LAB (OUTPATIENT)
Dept: LAB | Facility: CLINIC | Age: 75
End: 2025-05-07
Payer: COMMERCIAL

## 2025-05-07 DIAGNOSIS — N18.31 CHRONIC KIDNEY DISEASE, STAGE 3A (H): ICD-10-CM

## 2025-05-07 LAB
CREAT UR-MCNC: 187 MG/DL
MICROALBUMIN UR-MCNC: <12 MG/L
MICROALBUMIN/CREAT UR: NORMAL MG/G{CREAT}

## 2025-05-07 PROCEDURE — 82570 ASSAY OF URINE CREATININE: CPT

## 2025-05-07 PROCEDURE — 82043 UR ALBUMIN QUANTITATIVE: CPT

## 2025-05-08 ENCOUNTER — RESULTS FOLLOW-UP (OUTPATIENT)
Dept: INTERNAL MEDICINE | Facility: CLINIC | Age: 75
End: 2025-05-08

## 2025-06-04 ENCOUNTER — RESULTS FOLLOW-UP (OUTPATIENT)
Dept: INTERNAL MEDICINE | Facility: CLINIC | Age: 75
End: 2025-06-04

## 2025-06-04 ENCOUNTER — HOSPITAL ENCOUNTER (OUTPATIENT)
Dept: CT IMAGING | Facility: CLINIC | Age: 75
Discharge: HOME | End: 2025-06-04
Attending: INTERNAL MEDICINE
Payer: COMMERCIAL

## 2025-06-04 DIAGNOSIS — Z87.891 HISTORY OF SMOKING: ICD-10-CM

## 2025-06-04 PROCEDURE — 71271 CT THORAX LUNG CANCER SCR C-: CPT

## 2025-07-18 PROBLEM — Z86.0100 HISTORY OF COLONIC POLYPS: Status: ACTIVE | Noted: 2019-10-30

## 2025-07-18 PROBLEM — D12.5 BENIGN NEOPLASM OF SIGMOID COLON: Status: ACTIVE | Noted: 2019-10-30

## 2025-07-18 PROBLEM — B96.89 DISORDER ASSOCIATED WITH HELICOBACTER SPECIES: Status: ACTIVE | Noted: 2025-04-25

## 2025-07-18 PROBLEM — K21.00 GASTROESOPHAGEAL REFLUX DISEASE WITH ESOPHAGITIS WITHOUT HEMORRHAGE: Status: RESOLVED | Noted: 2025-03-04 | Resolved: 2025-07-18

## 2025-07-18 PROBLEM — K21.9 GASTROESOPHAGEAL REFLUX DISEASE WITHOUT ESOPHAGITIS: Status: ACTIVE | Noted: 2025-04-23

## 2025-07-18 PROBLEM — D12.4 BENIGN NEOPLASM OF DESCENDING COLON: Status: ACTIVE | Noted: 2025-04-25

## 2025-07-18 PROBLEM — K76.9 DISEASE OF LIVER: Status: RESOLVED | Noted: 2024-08-20 | Resolved: 2025-07-18

## 2025-07-18 PROBLEM — K29.70 GASTRITIS: Status: ACTIVE | Noted: 2025-04-25

## 2025-07-18 PROBLEM — D12.2 BENIGN NEOPLASM OF ASCENDING COLON: Status: ACTIVE | Noted: 2019-10-30

## 2025-07-18 PROBLEM — K29.70 GASTRITIS: Status: RESOLVED | Noted: 2025-04-25 | Resolved: 2025-07-18

## 2025-07-18 PROBLEM — I47.10 PAROXYSMAL SUPRAVENTRICULAR TACHYCARDIA: Status: ACTIVE | Noted: 2025-07-18

## 2025-07-18 PROBLEM — K63.5 POLYP OF COLON: Status: ACTIVE | Noted: 2025-04-23

## 2025-07-18 PROBLEM — K76.9 DISEASE OF LIVER: Status: ACTIVE | Noted: 2024-08-20

## 2025-07-18 PROBLEM — Z97.4 WEARS HEARING AID IN BOTH EARS: Status: RESOLVED | Noted: 2019-03-01 | Resolved: 2025-07-18

## 2025-07-18 PROBLEM — N36.42 INTRINSIC SPHINCTER DEFICIENCY: Status: ACTIVE | Noted: 2025-05-16

## 2025-07-31 ENCOUNTER — TRANSFERRED RECORDS (OUTPATIENT)
Dept: ADMINISTRATIVE | Facility: CLINIC | Age: 75
End: 2025-07-31
Payer: COMMERCIAL

## 2025-08-01 NOTE — PROCEDURES
2025        Catarina Mendenhall   [address] [place] [address]      Catarina Mendenhall,  :  1950    I'm writing to let you know about the tests that were taken recently.   Thank you for allowing [facility] the opportunity to take part in your healthcare.  At [facility] we strive to provide each patient with the finest gastroenterology care available.  We hope your experience was pleasant and informative.    Stool H. pylori antigen was negative.  This indicates successful eradication of H. pylori.  Follow-up in the clinic if you have  any ongoing GI symptoms.  H. pylori Stool Ag, EIA 2025 11:03   Description Result Units Flags Range   H. pylori Stool Ag, EIA Negative   Negative   Comments   SRC:Stool  in 2 to 3 month s                            Performed At: [address], [id number]  Shiv Berry MD, Phone: [phone]           Thank you.    Electronically signed by:  Christian Moore MD 2025 11:06 AM  Document generated by:  Christian Moore MD  2025  If your provider ordered multiple tests; the results may not become available at the same time.  If multiple test results are received within 14 days of one another, you may receive a duplicate.

## 2025-08-27 ENCOUNTER — OFFICE VISIT (OUTPATIENT)
Dept: INTERNAL MEDICINE | Facility: CLINIC | Age: 75
End: 2025-08-27
Payer: COMMERCIAL

## 2025-08-27 VITALS
HEIGHT: 64 IN | RESPIRATION RATE: 14 BRPM | WEIGHT: 177 LBS | OXYGEN SATURATION: 95 % | BODY MASS INDEX: 30.22 KG/M2 | HEART RATE: 69 BPM | DIASTOLIC BLOOD PRESSURE: 82 MMHG | SYSTOLIC BLOOD PRESSURE: 122 MMHG | TEMPERATURE: 98.1 F

## 2025-08-27 DIAGNOSIS — I10 ESSENTIAL HYPERTENSION: ICD-10-CM

## 2025-08-27 DIAGNOSIS — I25.84 CORONARY ARTERY DISEASE DUE TO CALCIFIED CORONARY LESION: ICD-10-CM

## 2025-08-27 DIAGNOSIS — F40.243 ANXIETY WITH FLYING: Primary | ICD-10-CM

## 2025-08-27 DIAGNOSIS — I25.10 CORONARY ARTERY DISEASE DUE TO CALCIFIED CORONARY LESION: ICD-10-CM

## 2025-08-27 DIAGNOSIS — R42 DIZZINESS: ICD-10-CM

## 2025-08-27 PROCEDURE — 3074F SYST BP LT 130 MM HG: CPT | Performed by: NURSE PRACTITIONER

## 2025-08-27 PROCEDURE — 3079F DIAST BP 80-89 MM HG: CPT | Performed by: NURSE PRACTITIONER

## 2025-08-27 PROCEDURE — 99214 OFFICE O/P EST MOD 30 MIN: CPT | Performed by: NURSE PRACTITIONER

## 2025-08-27 PROCEDURE — G2211 COMPLEX E/M VISIT ADD ON: HCPCS | Performed by: NURSE PRACTITIONER

## 2025-08-27 RX ORDER — MECLIZINE HYDROCHLORIDE 25 MG/1
25 TABLET ORAL 3 TIMES DAILY PRN
Qty: 30 TABLET | Refills: 0 | Status: SHIPPED | OUTPATIENT
Start: 2025-08-27

## 2025-08-27 RX ORDER — CLOTRIMAZOLE AND BETAMETHASONE DIPROPIONATE 10; .64 MG/G; MG/G
45 CREAM TOPICAL PRN
COMMUNITY
Start: 2025-08-18

## 2025-08-27 RX ORDER — LORAZEPAM 0.5 MG/1
TABLET ORAL
Qty: 4 TABLET | Refills: 0 | Status: SHIPPED | OUTPATIENT
Start: 2025-08-27

## (undated) DEVICE — CUSTOM PACK CYSTO PREFERRED SOT5BCYHEA

## (undated) DEVICE — BRIEF STRETCH XL MPS40

## (undated) DEVICE — SOL WATER IRRIG 3000ML BAG 2B7117

## (undated) DEVICE — GLOVE SURG PI ULTRA TOUCH M SZ 8 LF

## (undated) DEVICE — STOPCOCK 3 WAY 500 PSI M3SNC

## (undated) DEVICE — MAT FLOOR SURGICAL 40X38 0702140238

## (undated) DEVICE — SOL WATER IRRIG 1000ML BOTTLE 2F7114

## (undated) DEVICE — SUCTION MANIFOLD NEPTUNE 2 SYS 1 PORT 702-025-000

## (undated) DEVICE — PREP DYNA-HEX 4% CHG SCRUB 4OZ BOTTLE MDS098710

## (undated) DEVICE — GLOVE SURGEON PI ORTHO SZ 6-1/2 LF

## (undated) DEVICE — GOWN IMPERVIOUS BREATHABLE SMART XLG 89045

## (undated) DEVICE — GLOVE UNDER INDICATOR PI SZ 7.0 LF 41670

## (undated) DEVICE — TUBING SUCTION MEDI-VAC 1/4"X20' N620A

## (undated) RX ORDER — FENTANYL CITRATE 50 UG/ML
INJECTION, SOLUTION INTRAMUSCULAR; INTRAVENOUS
Status: DISPENSED
Start: 2023-05-15